# Patient Record
Sex: MALE | Race: OTHER | HISPANIC OR LATINO | URBAN - METROPOLITAN AREA
[De-identification: names, ages, dates, MRNs, and addresses within clinical notes are randomized per-mention and may not be internally consistent; named-entity substitution may affect disease eponyms.]

---

## 2018-03-16 ENCOUNTER — TRANSCRIBE ORDERS (OUTPATIENT)
Dept: ADMINISTRATIVE | Facility: HOSPITAL | Age: 38
End: 2018-03-16

## 2018-03-16 DIAGNOSIS — R94.5 NONSPECIFIC ABNORMAL RESULTS OF LIVER FUNCTION STUDY: Primary | ICD-10-CM

## 2018-03-23 ENCOUNTER — APPOINTMENT (OUTPATIENT)
Dept: SLEEP MEDICINE | Facility: HOSPITAL | Age: 38
End: 2018-03-23
Attending: INTERNAL MEDICINE

## 2018-03-27 ENCOUNTER — HOSPITAL ENCOUNTER (OUTPATIENT)
Dept: ULTRASOUND IMAGING | Facility: HOSPITAL | Age: 38
Discharge: HOME | End: 2018-03-27
Admitting: NURSE PRACTITIONER

## 2018-03-27 DIAGNOSIS — R94.5 NONSPECIFIC ABNORMAL RESULTS OF LIVER FUNCTION STUDY: ICD-10-CM

## 2018-03-27 PROCEDURE — 76705 ECHO EXAM OF ABDOMEN: CPT

## 2018-03-28 ENCOUNTER — OFFICE VISIT (OUTPATIENT)
Dept: SLEEP MEDICINE | Facility: HOSPITAL | Age: 38
End: 2018-03-28
Attending: INTERNAL MEDICINE

## 2018-03-28 VITALS
DIASTOLIC BLOOD PRESSURE: 94 MMHG | HEART RATE: 97 BPM | HEIGHT: 65 IN | BODY MASS INDEX: 29.99 KG/M2 | WEIGHT: 180 LBS | SYSTOLIC BLOOD PRESSURE: 132 MMHG

## 2018-03-28 DIAGNOSIS — G47.10 HYPERSOMNIA: ICD-10-CM

## 2018-03-28 DIAGNOSIS — G47.33 OSA (OBSTRUCTIVE SLEEP APNEA): Primary | ICD-10-CM

## 2018-03-28 DIAGNOSIS — R06.83 SNORING: ICD-10-CM

## 2018-03-28 PROCEDURE — G0463 HOSPITAL OUTPT CLINIC VISIT: HCPCS

## 2018-03-28 NOTE — PROGRESS NOTES
"[facility] SLEEP MEDICINE      Basilio Soriano  37 y.o.  male  1980    PCP:Michela Cano, APRN  [address] [id number]       Type of service: Initial consult.    Chief complaint: Snoring, witnessed apneas and excessive daytime sleepiness      History of present illness;  This is a 37 y.o. male being referred for evaluation of sleep apnea.  The patient reports symptoms of snoring, daytime excessive sleepiness and fatigue.  In addition patient also gives a history of waking up choking and gasping for breath.  Normally goes to bed around 10p.m. and wakes up around 6 a.m, still feels not rested well and tired.       PMH  Hypertension    Medications:  Lisinopril    Social history:  Shift work: no  Tobacco use: no  Alcohol use: yes 5 beers a day  Caffeinated drinks: no  Over-the-counter sleeping aid: no  Narcotic medications: no    Review of systems:  [place] Sleepiness Scale: Total score: 24   Positive for snoring, witnessed apneas, fatigue and daytime excessive sleepiness,   Negative for shortness of breath, cough, wheezing, chest pain, nausea and vomiting, Swelling of feet  Morning symptoms  Dry mouth yes  Moring headaches yes  Nasal Congestion no  Leg movements no  Nocturia (how many times/night) 2-3  Memory Problems yes    Physical exam:  Vitals:    03/28/18 1300   BP: 132/94   Pulse: 97   Weight: 81.6 kg (180 lb)   Height: 165.1 cm (65\")    Body mass index is 29.95 kg/m². Neck Circumference: 18 inches  HEENT: Head is atraumatic, pupils are round equal and reacting to light,  no nasal septal defects or deviation and the nasal passages are clear, tonsils are not enlarged, oral airway Mallampati class IV  NECK: No lymphadenopathy, trachea is in the midline  RESPIRATORY SYSTEM: Breath sounds are equal on both sides, there are no wheezes or crackles  CARDIOVASULAR SYSTEM: Heart sounds are regular and normal, there are no murmurs or thrills  ABDOMEN: Soft, no " hepatosplenomegaly, no evidence of ascites  EXTREMITES: No cyanosis, clubbing or edema   NEUROLOGICAL SYSTEM: Oriented x 3, no gross neurological defects    Assessment and plan:  · Obstructive sleep apnea:  I strongly suspect the patient has sleep apnea as suggested by the symptoms and physical examination.  I have talked to the patient about the signs and symptoms of sleep apnea and consequences of untreated sleep apnea.  I'm going to order a  in lab split night sleep test.  Will have a follow-up after this sleep test is done  · Snoring.  · Hypersomnia most likely secondary to sleep apnea      Enzo Hart MD, [facility]  Pulmonary, Critical Care and sleep Medicine

## 2018-04-11 ENCOUNTER — HOSPITAL ENCOUNTER (OUTPATIENT)
Dept: SLEEP MEDICINE | Facility: HOSPITAL | Age: 38
Discharge: HOME | End: 2018-04-11
Attending: INTERNAL MEDICINE | Admitting: INTERNAL MEDICINE

## 2018-04-11 DIAGNOSIS — R06.83 SNORING: ICD-10-CM

## 2018-04-11 DIAGNOSIS — G47.10 HYPERSOMNIA: ICD-10-CM

## 2018-04-11 DIAGNOSIS — G47.33 OSA (OBSTRUCTIVE SLEEP APNEA): ICD-10-CM

## 2018-04-11 PROCEDURE — 95811 POLYSOM 6/>YRS CPAP 4/> PARM: CPT

## 2018-04-18 ENCOUNTER — TELEPHONE (OUTPATIENT)
Dept: SLEEP MEDICINE | Facility: HOSPITAL | Age: 38
End: 2018-04-18

## 2018-04-18 NOTE — TELEPHONE ENCOUNTER
Spoke with patient to review sleep study results.  Confirmed [facility] as DME. [facility]

## 2018-05-23 ENCOUNTER — APPOINTMENT (OUTPATIENT)
Dept: SLEEP MEDICINE | Facility: HOSPITAL | Age: 38
End: 2018-05-23
Attending: INTERNAL MEDICINE

## 2018-10-10 ENCOUNTER — APPOINTMENT (OUTPATIENT)
Dept: SLEEP MEDICINE | Facility: HOSPITAL | Age: 38
End: 2018-10-10
Attending: INTERNAL MEDICINE

## 2019-01-17 ENCOUNTER — APPOINTMENT (OUTPATIENT)
Dept: GENERAL RADIOLOGY | Facility: HOSPITAL | Age: 39
End: 2019-01-17

## 2019-01-17 ENCOUNTER — HOSPITAL ENCOUNTER (EMERGENCY)
Facility: HOSPITAL | Age: 39
Discharge: HOME | End: 2019-01-17
Attending: EMERGENCY MEDICINE | Admitting: EMERGENCY MEDICINE

## 2019-01-17 VITALS
HEART RATE: 90 BPM | HEIGHT: 65 IN | RESPIRATION RATE: 16 BRPM | SYSTOLIC BLOOD PRESSURE: 141 MMHG | TEMPERATURE: 98.4 F | DIASTOLIC BLOOD PRESSURE: 107 MMHG | WEIGHT: 170 LBS | BODY MASS INDEX: 28.32 KG/M2 | OXYGEN SATURATION: 96 %

## 2019-01-17 DIAGNOSIS — F41.9 ANXIETY: ICD-10-CM

## 2019-01-17 DIAGNOSIS — J18.9 PNEUMONIA OF RIGHT MIDDLE LOBE DUE TO INFECTIOUS ORGANISM: Primary | ICD-10-CM

## 2019-01-17 LAB
FLUAV AG NPH QL: NEGATIVE
FLUBV AG NPH QL IA: NEGATIVE

## 2019-01-17 PROCEDURE — 94640 AIRWAY INHALATION TREATMENT: CPT

## 2019-01-17 PROCEDURE — 87804 INFLUENZA ASSAY W/OPTIC: CPT | Performed by: EMERGENCY MEDICINE

## 2019-01-17 PROCEDURE — 99283 EMERGENCY DEPT VISIT LOW MDM: CPT

## 2019-01-17 PROCEDURE — 71046 X-RAY EXAM CHEST 2 VIEWS: CPT

## 2019-01-17 RX ORDER — HYDROXYZINE PAMOATE 50 MG/1
50 CAPSULE ORAL DAILY
Qty: 10 CAPSULE | Refills: 0 | Status: SHIPPED | OUTPATIENT
Start: 2019-01-17

## 2019-01-17 RX ORDER — IPRATROPIUM BROMIDE AND ALBUTEROL SULFATE 2.5; .5 MG/3ML; MG/3ML
3 SOLUTION RESPIRATORY (INHALATION) ONCE
Status: COMPLETED | OUTPATIENT
Start: 2019-01-17 | End: 2019-01-17

## 2019-01-17 RX ORDER — AZITHROMYCIN 250 MG/1
TABLET, FILM COATED ORAL
Qty: 6 TABLET | Refills: 0 | Status: SHIPPED | OUTPATIENT
Start: 2019-01-17

## 2019-01-17 RX ORDER — PREDNISONE 20 MG/1
20 TABLET ORAL 2 TIMES DAILY
Qty: 10 TABLET | Refills: 0 | Status: SHIPPED | OUTPATIENT
Start: 2019-01-17 | End: 2019-01-22

## 2019-01-17 RX ORDER — ALBUTEROL SULFATE 90 UG/1
2 AEROSOL, METERED RESPIRATORY (INHALATION) EVERY 4 HOURS PRN
Qty: 1 INHALER | Refills: 0 | Status: SHIPPED | OUTPATIENT
Start: 2019-01-17

## 2019-01-17 RX ADMIN — IPRATROPIUM BROMIDE AND ALBUTEROL SULFATE 3 ML: .5; 3 SOLUTION RESPIRATORY (INHALATION) at 13:40

## 2019-01-17 NOTE — DISCHARGE INSTRUCTIONS
Return to the emergency department with worsening symptoms, uncontrolled pain, inability to tolerate oral liquids, fever greater than 101° F not controlled by Tylenol or as needed with emergent concerns.    Call for an appointment with primary care provider to follow-up within the next week if symptoms are not improving.

## 2019-01-17 NOTE — ED PROVIDER NOTES
Subjective   History of Present Illness     History of Present Illness    Chief complaint: Cough    Location: Chest and nasal congestion    Quality/Severity:  Productive with thick, dark green sputum.    Timing/Duration: One month    Modifying Factors: Patient denies taking OTC medications to palliate cough and congestion    Associated Symptoms: Positive for right ear pain, dyspnea on exertion, chills, diarrhea, and vomiting after coughing.  Negative for fever, chest pain, dizziness, syncope.    Narrative:     Patient is a 38-year-old  male presenting to the ED for cough and dyspnea on exertion for one month.  Patient states cough is productive with thick dark green sputum that is difficult to cough up, and he has fullness in his right ear and popping when blowing his nose.  Patient states he has not tried any over-the-counter medication such as expectorants or decongestants.  Patient states he needs to sit down at times when he is up moving around because he becomes short of breath. Patient states he is also having chills and is not sure if he is having a fever at home because he does not own a thermometer.  The patient denies smoking, only past medical history hypertension and ODESSA, but he does not yet have a CPAP.    Patient also complaining of watery diarrhea for the past 2 weeks, patient is taking Pepto-Bismol without significant relief, has not tried Imodium.  Patient denies abdominal pain, blood in stool, dizziness changing position, syncope.      Review of Systems     General: Complaining of chills.  Denies any weakness or fatigue.  Denies any weight loss or weight gain.  SKIN: Denies any rashes lesions or ulcers.  Denies color change.  ENT: Complaining of sore throat, nasal congestion and right ear pain.  EYES: Denies any blurred vision.  Denies any change in vision.  Denies any photophobia.  Denies any vision loss.  LUNGS: Admits to shortness of breath on exertion, wheezing and productive cough with  dark green sputum.  Denies any hemoptysis.  CARDIAC: Denies any chest pain.  Denies palpitations.  Denies syncope.  Denies any edema  ABD: Admits to diarrhea x2 weeks, and vomiting if he coughs too hard. Denies any abdominal pain. Denies any rectal bleeding.  Denies constipation  : Denies any dysuria, urgency, frequency or hematuria.  Denies discharge.  Denies flank pain.  NEURO: Denies any focal weakness.  Denies headache.  Denies seizures.  Denies changes in speech or difficulty walking.  ENDOCRINE: Denies polydipsia and polyuria  M/S: Denies arthralgias, back pain, myalgias or neck pain  HEME/LYMPH: Negative for adenopathy. Does not bruise/bleed easily.   PSYCH: Admits to anxiety during the night. Negative for suicidal ideas. Denies depression  review was performed in addition to those in the above all other reviews are negative.        Past Medical History:   Diagnosis Date   • Hypertension        No Known Allergies    History reviewed. No pertinent surgical history.    History reviewed. No pertinent family history.    Social History     Socioeconomic History   • Marital status: Unknown     Spouse name: Not on file   • Number of children: Not on file   • Years of education: Not on file   • Highest education level: Not on file   Tobacco Use   • Smoking status: Never Smoker   Substance and Sexual Activity   • Alcohol use: No     Frequency: Never   • Drug use: No       No current facility-administered medications for this encounter.   No current outpatient medications on file.      Objective   Physical Exam     Vitals:    01/17/19 1340   BP:    Pulse: 89   Resp: 16   Temp:    SpO2: 97%   /112, temp 98.4    Physical Exam  Constitutional: Pt is oriented to person, place, and time, appears well-developed and well-nourished. No distress.   HENT:   Head: Normocephalic and atraumatic.   Right Ear: External ear normal. TM normal.  Left Ear: External ear normal. TM normal.  Mouth/Throat: Oropharynx is clear and  moist. No oropharyngeal exudate.   Eyes: EOM are normal. Pupils are equal, round, and reactive to light.   Neck: Normal range of motion. Neck supple.   Cardiovascular: Normal rate, regular rhythm and normal heart sounds. Exam reveals no murmur, rubs, or gallops.   Pulmonary/Chest: Respiratory effort normal. Wheezing and rhonchi bilaterally.   Abdominal: Soft, non-distended. Bowel sounds are active. There is no tenderness or guarding.   Neurological: Pt is alert and oriented to person, place, and time. No cranial nerve deficit or sensory deficit. Pt exhibits normal muscle tone and strength in all extremities bilaterally. Coordination normal.   MSK: No gross deformities appreciated.   Skin: Skin is warm and dry.   Psychiatric: Pt has a normal mood and affect. Behavior is normal. Judgment and thought content normal.           Procedures           ED Course  ED Course as of Jan 17 1345   Thu Jan 17, 2019   1338 Influenza A Ag, EIA: Negative [KS]   1338 Influenza B Ag, EIA: Negative [KS]      ED Course User Index  [KS] Mary Nogueira, PAKarlieC        1430 Patient given duo neb, states he was able to cough up more phlegm afterward. Patient reassessed and is still wheezing throughout, rhonchi predominantly heard in right lung.     Xr Chest 2 View    Result Date: 1/17/2019  Narrative: CHEST X-RAY, 1/17/2019     HISTORY: 38-year-old male in the ED complaining of one month history productive cough, chills and body aches.  TECHNIQUE: PA and lateral upright chest x-ray.  FINDINGS: The exam shows mild infiltrate in the right middle lobe as well as bilateral peribronchial cuffing. The lungs are otherwise clear. No pleural effusion. Heart size and pulmonary vascularity are normal.      Impression: 1. Right middle lobe infiltrate. 2. Perihilar bronchial cuffing likely associated with inflammatory airway disease.  This report was finalized on 1/17/2019 2:31 PM by Dr. Layo Ozuna MD.        Reviewed CXR. Independently viewed  by me. Interpreted by radiologist. Discussed with patient.     Patient discharged with albuterol inhaler, Z-francisco, prednisone for his pneumonia, and vistaril for his anxiety. Patient instructed to follow up with PCP next week. Advised patient he could take OTC loperamide, and to follow up with PCP about his diarrhea.    Discussed pertinent imaging findings with the patient/family.  Patient/Family voiced understanding of need to follow-up for recheck, further testing as needed.  Return to the emergency Department warnings were given.                MDM  Number of Diagnoses or Management Options  Anxiety: new and does not require workup  Pneumonia of right middle lobe due to infectious organism (CMS/HCC): new and requires workup     Amount and/or Complexity of Data Reviewed  Clinical lab tests: reviewed and ordered  Tests in the radiology section of CPT®: reviewed and ordered  Tests in the medicine section of CPT®: ordered and reviewed  Independent visualization of images, tracings, or specimens: yes    Patient Progress  Patient progress: improved        Final diagnoses:   Pneumonia of right middle lobe due to infectious organism (CMS/HCC)   Anxiety     Dictated utilizing Dragon dictation         Mary Nogueira PA-C  01/17/19 0973

## 2019-07-19 ENCOUNTER — OFFICE VISIT (OUTPATIENT)
Dept: SLEEP MEDICINE | Facility: HOSPITAL | Age: 39
End: 2019-07-19

## 2019-07-19 VITALS
HEIGHT: 65 IN | WEIGHT: 165 LBS | HEART RATE: 95 BPM | DIASTOLIC BLOOD PRESSURE: 102 MMHG | BODY MASS INDEX: 27.49 KG/M2 | SYSTOLIC BLOOD PRESSURE: 158 MMHG

## 2019-07-19 DIAGNOSIS — R06.83 SNORING: ICD-10-CM

## 2019-07-19 DIAGNOSIS — G47.10 HYPERSOMNIA: ICD-10-CM

## 2019-07-19 DIAGNOSIS — G47.33 OSA (OBSTRUCTIVE SLEEP APNEA): Primary | ICD-10-CM

## 2019-07-19 PROCEDURE — G0463 HOSPITAL OUTPT CLINIC VISIT: HCPCS

## 2019-07-19 PROCEDURE — 99214 OFFICE O/P EST MOD 30 MIN: CPT | Performed by: INTERNAL MEDICINE

## 2019-07-19 NOTE — PROGRESS NOTES
[facility]  [address]  [address]  [place] [address]  Phone   Fax       SLEEP CLINIC FOLLOW UP PROGRESS NOTE.    Basilio Soriano  1980  38 y.o.  male      PCP: Michela Cano APRN      Date of visit: 7/19/2019    Chief Complaint   Patient presents with   • Sleep Apnea       INTERM HISTORY:  This is a 38-year-old male who underwent split-night sleep study last year in April 2018.  He was found to have severe sleep apnea with AHI of 125/h and underwent CPAP titration.  After the study he had a family emergency in [place] and the left to [place] without getting his CPAP.  He tells me that his father had a stroke and had to take care of him.  Now he is back and his symptoms are worse with the daytime excessive sleepiness, snoring and fatigue.  He is accompanied by his mother.  He is in the construction work.       Sleep schedule  Normally goes to bed at 1030 PM  Wakes up at 6 AM  Feels unrefreshed after waking up:       PAST MEDICAL HISTORY:  · Obstructive sleep apnea, severe with AHI of 125/h  · Hypersomnia secondary to sleep apnea  · Snoring  Past Medical History:   Diagnosis Date   • Hypertension        MEDICATIONS: reviewed by me    Current Outpatient Medications:   •  albuterol sulfate  (90 Base) MCG/ACT inhaler, Inhale 2 puffs Every 4 (Four) Hours As Needed for Wheezing., Disp: 1 inhaler, Rfl: 0  •  azithromycin (ZITHROMAX Z-YING) 250 MG tablet, Take 2 tablets the first day, then 1 tablet daily for 4 days., Disp: 6 tablet, Rfl: 0  •  hydrOXYzine (VISTARIL) 50 MG capsule, Take 1 capsule by mouth Daily. Before bedtime., Disp: 10 capsule, Rfl: 0    No Known Allergies reviewed by me    SOCIAL, FAMILY HISTORY: Medical records are reviewed and noted by me.    REVIEW OF SYSTEMS:   [place] Sleepiness Scale :Total score: 24   Snoring: Yes  Morning headache: Yes  Nasal congestion: no  Leg movements: No  Leg swelling no  Irregular heart beat  "no  Heart burn no    PHYSICAL EXAMINATION:  Vitals:    07/19/19 1300   BP: (!) 158/102   Pulse: 95   Weight: 74.8 kg (165 lb)   Height: 165.1 cm (65\")    Body mass index is 27.46 kg/m².    HEENT: pupils are round equal and reacting to light and accommodation, nasal passage is clear, no nasal polyps, no lymphadenopathy, throat is clear, oral airway Mallampati class 4  RESPRATORY SYSTEM: Breath sounds are equal on both sides and are normal, no wheezes or crackles  CARDIOVASULAR SYSTEM: Heart rate is regular without murmur  ABDOMEN: Soft, no ascites, no hepatosplenomegaly.  EXTREMITIES: No cyanosis, clubbing or edema       ASSESSMENT AND PLAN:  · Obstructive sleep apnea, patient has severe sleep apnea.  Unfortunately he did not get the CPAP.  I have talked to the patient and his mother about the severity of sleep apnea.  I am going to arrange auto CPAP between 6 and 20 cm and see him in follow-up.  I am glad that he is back to get his CPAP.  And also I am happy to see that he did not had any complications due to severe sleep apnea.  · Snoring secondary to sleep apnea  · Hypersomnia, patient's [place] Sleepiness Scale is 24.  He has severe sleep apnea and this should improve with treatment of his severe sleep apnea        Enzo Hart MD, [facility]  Sleep Medicine.(Board-certified)  [facility]   7/19/2019               "

## 2019-08-29 ENCOUNTER — TELEPHONE (OUTPATIENT)
Dept: SLEEP MEDICINE | Facility: HOSPITAL | Age: 39
End: 2019-08-29

## 2019-08-29 NOTE — TELEPHONE ENCOUNTER
Patient called today having trouble with pressure too high, printed his download and will put in Dr. Hart's box to look over. Scheduled him first avavilable f/u on 9/27/19, told patient I would call him tomorrow after the doctor looked it over-AK

## 2019-08-30 ENCOUNTER — TELEPHONE (OUTPATIENT)
Dept: SLEEP MEDICINE | Facility: HOSPITAL | Age: 39
End: 2019-08-30

## 2019-08-30 NOTE — TELEPHONE ENCOUNTER
Spoke w/patient and let him know that he has huge leak in mask, patient has spoken w/[facility] and they are supposed to be working on getting him a new mask. Told patient that should help with the pressures being so high once the mask leak is corrected. He has a f/u on 9/27/19-AK

## 2019-09-27 ENCOUNTER — APPOINTMENT (OUTPATIENT)
Dept: SLEEP MEDICINE | Facility: HOSPITAL | Age: 39
End: 2019-09-27

## 2020-02-14 ENCOUNTER — APPOINTMENT (OUTPATIENT)
Dept: SLEEP MEDICINE | Facility: HOSPITAL | Age: 40
End: 2020-02-14

## 2020-03-14 ENCOUNTER — HOSPITAL ENCOUNTER (EMERGENCY)
Facility: HOSPITAL | Age: 40
Discharge: HOME | End: 2020-03-14
Attending: EMERGENCY MEDICINE | Admitting: EMERGENCY MEDICINE

## 2020-03-14 VITALS
BODY MASS INDEX: 26.89 KG/M2 | RESPIRATION RATE: 18 BRPM | DIASTOLIC BLOOD PRESSURE: 81 MMHG | TEMPERATURE: 97.8 F | SYSTOLIC BLOOD PRESSURE: 129 MMHG | HEIGHT: 66 IN | WEIGHT: 167.3 LBS | HEART RATE: 96 BPM | OXYGEN SATURATION: 98 %

## 2020-03-14 DIAGNOSIS — F10.929 ALCOHOLIC INTOXICATION WITH COMPLICATION (HCC): ICD-10-CM

## 2020-03-14 DIAGNOSIS — IMO0002 SELF-INFLICTED INJURY: ICD-10-CM

## 2020-03-14 DIAGNOSIS — T07.XXXA MULTIPLE ABRASIONS: Primary | ICD-10-CM

## 2020-03-14 DIAGNOSIS — I10 ESSENTIAL HYPERTENSION: ICD-10-CM

## 2020-03-14 LAB
ALBUMIN SERPL-MCNC: 3.8 G/DL (ref 3.5–5.2)
ALBUMIN/GLOB SERPL: 0.9 G/DL
ALP SERPL-CCNC: 82 U/L (ref 39–117)
ALT SERPL W P-5'-P-CCNC: 54 U/L (ref 1–41)
AMPHET+METHAMPHET UR QL: NEGATIVE
AMPHETAMINES UR QL: NEGATIVE
ANION GAP SERPL CALCULATED.3IONS-SCNC: 13.4 MMOL/L (ref 5–15)
APAP SERPL-MCNC: <5 MCG/ML (ref 10–30)
AST SERPL-CCNC: 64 U/L (ref 1–40)
BARBITURATES UR QL SCN: NEGATIVE
BASOPHILS # BLD AUTO: 0.06 10*3/MM3 (ref 0–0.2)
BASOPHILS NFR BLD AUTO: 1.4 % (ref 0–1.5)
BENZODIAZ UR QL SCN: NEGATIVE
BILIRUB SERPL-MCNC: 0.4 MG/DL (ref 0.2–1.2)
BUN BLD-MCNC: 4 MG/DL (ref 6–20)
BUN/CREAT SERPL: 7.8 (ref 7–25)
BUPRENORPHINE SERPL-MCNC: NEGATIVE NG/ML
CALCIUM SPEC-SCNC: 8.8 MG/DL (ref 8.6–10.5)
CANNABINOIDS SERPL QL: NEGATIVE
CHLORIDE SERPL-SCNC: 104 MMOL/L (ref 98–107)
CO2 SERPL-SCNC: 23.6 MMOL/L (ref 22–29)
COCAINE UR QL: NEGATIVE
CREAT BLD-MCNC: 0.51 MG/DL (ref 0.76–1.27)
DEPRECATED RDW RBC AUTO: 48 FL (ref 37–54)
EOSINOPHIL # BLD AUTO: 0.08 10*3/MM3 (ref 0–0.4)
EOSINOPHIL NFR BLD AUTO: 1.8 % (ref 0.3–6.2)
ERYTHROCYTE [DISTWIDTH] IN BLOOD BY AUTOMATED COUNT: 14.5 % (ref 12.3–15.4)
ETHANOL BLD-MCNC: 289 MG/DL (ref 0–10)
ETHANOL UR QL: 0.29 %
GFR SERPL CREATININE-BSD FRML MDRD: >150 ML/MIN/1.73
GLOBULIN UR ELPH-MCNC: 4.2 GM/DL
GLUCOSE BLD-MCNC: 140 MG/DL (ref 65–99)
HCT VFR BLD AUTO: 45.3 % (ref 37.5–51)
HGB BLD-MCNC: 15 G/DL (ref 13–17.7)
IMM GRANULOCYTES # BLD AUTO: 0 10*3/MM3 (ref 0–0.05)
IMM GRANULOCYTES NFR BLD AUTO: 0 % (ref 0–0.5)
LYMPHOCYTES # BLD AUTO: 2.25 10*3/MM3 (ref 0.7–3.1)
LYMPHOCYTES NFR BLD AUTO: 51.6 % (ref 19.6–45.3)
MCH RBC QN AUTO: 30.1 PG (ref 26.6–33)
MCHC RBC AUTO-ENTMCNC: 33.1 G/DL (ref 31.5–35.7)
MCV RBC AUTO: 91 FL (ref 79–97)
METHADONE UR QL SCN: NEGATIVE
MONOCYTES # BLD AUTO: 0.56 10*3/MM3 (ref 0.1–0.9)
MONOCYTES NFR BLD AUTO: 12.8 % (ref 5–12)
NEUTROPHILS # BLD AUTO: 1.41 10*3/MM3 (ref 1.7–7)
NEUTROPHILS NFR BLD AUTO: 32.4 % (ref 42.7–76)
NRBC BLD AUTO-RTO: 0 /100 WBC (ref 0–0.2)
OPIATES UR QL: NEGATIVE
OXYCODONE UR QL SCN: NEGATIVE
PCP UR QL SCN: NEGATIVE
PLATELET # BLD AUTO: 219 10*3/MM3 (ref 140–450)
PMV BLD AUTO: 10 FL (ref 6–12)
POTASSIUM BLD-SCNC: 3.9 MMOL/L (ref 3.5–5.2)
PROPOXYPH UR QL: NEGATIVE
PROT SERPL-MCNC: 8 G/DL (ref 6–8.5)
RBC # BLD AUTO: 4.98 10*6/MM3 (ref 4.14–5.8)
SALICYLATES SERPL-MCNC: <3 MG/DL
SODIUM BLD-SCNC: 141 MMOL/L (ref 136–145)
TRICYCLICS UR QL SCN: NEGATIVE
WBC NRBC COR # BLD: 4.36 10*3/MM3 (ref 3.4–10.8)

## 2020-03-14 PROCEDURE — 80053 COMPREHEN METABOLIC PANEL: CPT | Performed by: EMERGENCY MEDICINE

## 2020-03-14 PROCEDURE — 85025 COMPLETE CBC W/AUTO DIFF WBC: CPT | Performed by: EMERGENCY MEDICINE

## 2020-03-14 PROCEDURE — 80307 DRUG TEST PRSMV CHEM ANLYZR: CPT | Performed by: EMERGENCY MEDICINE

## 2020-03-14 PROCEDURE — 99283 EMERGENCY DEPT VISIT LOW MDM: CPT

## 2020-03-14 PROCEDURE — 99284 EMERGENCY DEPT VISIT MOD MDM: CPT | Performed by: EMERGENCY MEDICINE

## 2020-03-14 NOTE — ED NOTES
Time team evaluating patient per Zoom (IPAD) meeting id # [id number]     Summer Mathew RN  03/14/20 0952

## 2020-03-14 NOTE — ED NOTES
Patients mother, Lisa Brown, arrived and is at patients bedside.     Lynn Rodrigues, RN  03/14/20 0347

## 2020-03-14 NOTE — ED NOTES
Patient asleep and arouses easily  Breakfast tray given to patient.  Explained to patient that Augusto Ledbetter will evaluate him shortly.  Patient requesting medication for anxiety.  Explained to him that MD would not medicate him at this time     Summer Mathew RN  03/14/20 0945
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
## 2020-03-14 NOTE — ED NOTES
Spoke with Jessie from Time team. She states that the evaluators will no longer be coming out to the facilities, but can evaluate patients by using tele communication.  Lynn Rodrigues RN  03/14/20 0334       Lynn Rodrigues RN  03/14/20 0337

## 2020-03-14 NOTE — ED NOTES
Jerzy Chong at the [facility].  Patient agrees with outpatient therapy.  Awaiting referral forms to be faxed from the [facility]     Summer Mathew RN  03/14/20 8442

## 2020-03-14 NOTE — ED NOTES
Spoke with patient and mom, [facility] phone number given to mom and patient.  Patient to arrive at the [facility] at 0800 on Monday for dual outpatient treatment     Summer Mathew RN  03/14/20 8731
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
## 2020-03-14 NOTE — ED PROVIDER NOTES
Subjective   History of Present Illness    Review of Systems    Past Medical History:   Diagnosis Date   • Anxiety    • Depression    • Hypertension        No Known Allergies    History reviewed. No pertinent surgical history.    History reviewed. No pertinent family history.    Social History     Socioeconomic History   • Marital status: Unknown     Spouse name: Not on file   • Number of children: Not on file   • Years of education: Not on file   • Highest education level: Not on file   Tobacco Use   • Smoking status: Never Smoker   Substance and Sexual Activity   • Alcohol use: Yes     Frequency: Never     Comment: Drinks daily   • Drug use: No           Objective   Physical Exam    Procedures           ED Course  ED Course as of Mar 14 1118   Sat Mar 14, 2020   0226 The laboratory values were reviewed by me.  The acetaminophen level is normal.  The serum glucose is 140.  The ALT is mildly elevated at 54 and the AST is mildly elevated at 64.  The ethanol level is 289.  The urine tox screen is pending.  The laboratory values are otherwise unremarkable.    [RC]   0302 The urine drug screen is negative.    [RC]      ED Course User Index  [RC] Salomón Gay MD            reeval, family at bedside and pt agreed to plan for outpt treatment by [facility]  Otherwise appears well w/o compliaint                                MDM  Number of Diagnoses or Management Options  Alcoholic intoxication with complication (CMS/HCC):   Essential hypertension:   Multiple abrasions:   Self-inflicted injury:       Final diagnoses:   Multiple abrasions   Self-inflicted injury   Alcoholic intoxication with complication (CMS/HCC)   Essential hypertension            Bassem Elise MD  03/14/20 1118

## 2020-04-10 ENCOUNTER — APPOINTMENT (OUTPATIENT)
Dept: SLEEP MEDICINE | Facility: HOSPITAL | Age: 40
End: 2020-04-10

## 2023-09-27 ENCOUNTER — HOSPITAL ENCOUNTER (EMERGENCY)
Facility: HOSPITAL | Age: 43
Discharge: HOME | End: 2023-09-27
Attending: EMERGENCY MEDICINE | Admitting: EMERGENCY MEDICINE
Payer: MEDICAID

## 2023-09-27 ENCOUNTER — APPOINTMENT (OUTPATIENT)
Dept: CT IMAGING | Facility: HOSPITAL | Age: 43
End: 2023-09-27
Payer: MEDICAID

## 2023-09-27 VITALS
HEIGHT: 66 IN | HEART RATE: 92 BPM | OXYGEN SATURATION: 85 % | SYSTOLIC BLOOD PRESSURE: 143 MMHG | DIASTOLIC BLOOD PRESSURE: 98 MMHG | BODY MASS INDEX: 31.64 KG/M2 | WEIGHT: 196.9 LBS | RESPIRATION RATE: 15 BRPM | TEMPERATURE: 98.6 F

## 2023-09-27 DIAGNOSIS — F10.129 ALCOHOL ABUSE WITH INTOXICATION: Primary | ICD-10-CM

## 2023-09-27 LAB
ALBUMIN SERPL-MCNC: 4.4 G/DL (ref 3.5–5.2)
ALBUMIN/GLOB SERPL: 1.3 G/DL
ALP SERPL-CCNC: 122 U/L (ref 39–117)
ALT SERPL W P-5'-P-CCNC: 26 U/L (ref 1–41)
AMPHET+METHAMPHET UR QL: NEGATIVE
AMPHETAMINES UR QL: NEGATIVE
ANION GAP SERPL CALCULATED.3IONS-SCNC: 16.7 MMOL/L (ref 5–15)
AST SERPL-CCNC: 28 U/L (ref 1–40)
BARBITURATES UR QL SCN: NEGATIVE
BASOPHILS # BLD AUTO: 0.04 10*3/MM3 (ref 0–0.2)
BASOPHILS NFR BLD AUTO: 0.6 % (ref 0–1.5)
BENZODIAZ UR QL SCN: NEGATIVE
BILIRUB SERPL-MCNC: 0.2 MG/DL (ref 0–1.2)
BILIRUB UR QL STRIP: NEGATIVE
BUN SERPL-MCNC: 7 MG/DL (ref 6–20)
BUN/CREAT SERPL: 13.5 (ref 7–25)
BUPRENORPHINE SERPL-MCNC: NEGATIVE NG/ML
CALCIUM SPEC-SCNC: 9 MG/DL (ref 8.6–10.5)
CANNABINOIDS SERPL QL: NEGATIVE
CHLORIDE SERPL-SCNC: 100 MMOL/L (ref 98–107)
CLARITY UR: CLEAR
CO2 SERPL-SCNC: 21.3 MMOL/L (ref 22–29)
COCAINE UR QL: NEGATIVE
COLOR UR: YELLOW
CREAT SERPL-MCNC: 0.52 MG/DL (ref 0.76–1.27)
DEPRECATED RDW RBC AUTO: 40.8 FL (ref 37–54)
EGFRCR SERPLBLD CKD-EPI 2021: 129.1 ML/MIN/1.73
EOSINOPHIL # BLD AUTO: 0.06 10*3/MM3 (ref 0–0.4)
EOSINOPHIL NFR BLD AUTO: 1 % (ref 0.3–6.2)
ERYTHROCYTE [DISTWIDTH] IN BLOOD BY AUTOMATED COUNT: 13.3 % (ref 12.3–15.4)
ETHANOL BLD-MCNC: 297 MG/DL (ref 0–10)
ETHANOL UR QL: 0.3 %
GLOBULIN UR ELPH-MCNC: 3.4 GM/DL
GLUCOSE SERPL-MCNC: 105 MG/DL (ref 65–99)
GLUCOSE UR STRIP-MCNC: NEGATIVE MG/DL
HCT VFR BLD AUTO: 44.6 % (ref 37.5–51)
HGB BLD-MCNC: 15.8 G/DL (ref 13–17.7)
HGB UR QL STRIP.AUTO: NEGATIVE
HOLD SPECIMEN: NORMAL
HOLD SPECIMEN: NORMAL
HYPOCHROMIA BLD QL: NORMAL
IMM GRANULOCYTES # BLD AUTO: 0.01 10*3/MM3 (ref 0–0.05)
IMM GRANULOCYTES NFR BLD AUTO: 0.2 % (ref 0–0.5)
KETONES UR QL STRIP: NEGATIVE
LEUKOCYTE ESTERASE UR QL STRIP.AUTO: NEGATIVE
LIPASE SERPL-CCNC: 29 U/L (ref 13–60)
LYMPHOCYTES # BLD AUTO: 3.44 10*3/MM3 (ref 0.7–3.1)
LYMPHOCYTES NFR BLD AUTO: 55.5 % (ref 19.6–45.3)
MAGNESIUM SERPL-MCNC: 1.8 MG/DL (ref 1.6–2.6)
MCH RBC QN AUTO: 29.8 PG (ref 26.6–33)
MCHC RBC AUTO-ENTMCNC: 35.4 G/DL (ref 31.5–35.7)
MCV RBC AUTO: 84.2 FL (ref 79–97)
METHADONE UR QL SCN: NEGATIVE
MONOCYTES # BLD AUTO: 0.6 10*3/MM3 (ref 0.1–0.9)
MONOCYTES NFR BLD AUTO: 9.7 % (ref 5–12)
NEUTROPHILS NFR BLD AUTO: 2.05 10*3/MM3 (ref 1.7–7)
NEUTROPHILS NFR BLD AUTO: 33 % (ref 42.7–76)
NITRITE UR QL STRIP: NEGATIVE
NRBC BLD AUTO-RTO: 0 /100 WBC (ref 0–0.2)
OPIATES UR QL: NEGATIVE
OXYCODONE UR QL SCN: NEGATIVE
PCP UR QL SCN: NEGATIVE
PH UR STRIP.AUTO: 6 [PH] (ref 4.5–8)
PLAT MORPH BLD: NORMAL
PLATELET # BLD AUTO: 245 10*3/MM3 (ref 140–450)
PMV BLD AUTO: 9.9 FL (ref 6–12)
POTASSIUM SERPL-SCNC: 3.2 MMOL/L (ref 3.5–5.2)
PROPOXYPH UR QL: NEGATIVE
PROT SERPL-MCNC: 7.8 G/DL (ref 6–8.5)
PROT UR QL STRIP: NEGATIVE
QT INTERVAL: 375 MS
QTC INTERVAL: 447 MS
RBC # BLD AUTO: 5.3 10*6/MM3 (ref 4.14–5.8)
SODIUM SERPL-SCNC: 138 MMOL/L (ref 136–145)
SP GR UR STRIP: <=1.005 (ref 1–1.03)
TRICYCLICS UR QL SCN: NEGATIVE
TROPONIN T SERPL HS-MCNC: <6 NG/L
UROBILINOGEN UR QL STRIP: NORMAL
WBC MORPH BLD: NORMAL
WBC NRBC COR # BLD: 6.2 10*3/MM3 (ref 3.4–10.8)
WHOLE BLOOD HOLD COAG: NORMAL
WHOLE BLOOD HOLD SPECIMEN: NORMAL

## 2023-09-27 PROCEDURE — 85025 COMPLETE CBC W/AUTO DIFF WBC: CPT | Performed by: EMERGENCY MEDICINE

## 2023-09-27 PROCEDURE — 85007 BL SMEAR W/DIFF WBC COUNT: CPT | Performed by: EMERGENCY MEDICINE

## 2023-09-27 PROCEDURE — 80053 COMPREHEN METABOLIC PANEL: CPT | Performed by: EMERGENCY MEDICINE

## 2023-09-27 PROCEDURE — 82077 ASSAY SPEC XCP UR&BREATH IA: CPT | Performed by: EMERGENCY MEDICINE

## 2023-09-27 PROCEDURE — 84484 ASSAY OF TROPONIN QUANT: CPT

## 2023-09-27 PROCEDURE — 83735 ASSAY OF MAGNESIUM: CPT

## 2023-09-27 PROCEDURE — 83690 ASSAY OF LIPASE: CPT | Performed by: EMERGENCY MEDICINE

## 2023-09-27 PROCEDURE — 96374 THER/PROPH/DIAG INJ IV PUSH: CPT

## 2023-09-27 PROCEDURE — 25010000002 ONDANSETRON PER 1 MG: Performed by: EMERGENCY MEDICINE

## 2023-09-27 PROCEDURE — 25510000001 IOPAMIDOL PER 1 ML: Performed by: EMERGENCY MEDICINE

## 2023-09-27 PROCEDURE — 80306 DRUG TEST PRSMV INSTRMNT: CPT

## 2023-09-27 PROCEDURE — 74177 CT ABD & PELVIS W/CONTRAST: CPT

## 2023-09-27 PROCEDURE — 99285 EMERGENCY DEPT VISIT HI MDM: CPT

## 2023-09-27 PROCEDURE — 81003 URINALYSIS AUTO W/O SCOPE: CPT | Performed by: EMERGENCY MEDICINE

## 2023-09-27 PROCEDURE — 25010000002 DIPHENHYDRAMINE PER 50 MG: Performed by: EMERGENCY MEDICINE

## 2023-09-27 PROCEDURE — 93005 ELECTROCARDIOGRAM TRACING: CPT

## 2023-09-27 PROCEDURE — 25010000002 PROCHLORPERAZINE 10 MG/2ML SOLUTION: Performed by: EMERGENCY MEDICINE

## 2023-09-27 PROCEDURE — 96375 TX/PRO/DX INJ NEW DRUG ADDON: CPT

## 2023-09-27 RX ORDER — HYDROXYZINE HYDROCHLORIDE 25 MG/1
25 TABLET, FILM COATED ORAL ONCE
Status: COMPLETED | OUTPATIENT
Start: 2023-09-27 | End: 2023-09-27

## 2023-09-27 RX ORDER — PROCHLORPERAZINE EDISYLATE 5 MG/ML
10 INJECTION INTRAMUSCULAR; INTRAVENOUS ONCE
Status: COMPLETED | OUTPATIENT
Start: 2023-09-27 | End: 2023-09-27

## 2023-09-27 RX ORDER — ONDANSETRON 2 MG/ML
8 INJECTION INTRAMUSCULAR; INTRAVENOUS ONCE
Status: COMPLETED | OUTPATIENT
Start: 2023-09-27 | End: 2023-09-27

## 2023-09-27 RX ORDER — SODIUM CHLORIDE 0.9 % (FLUSH) 0.9 %
10 SYRINGE (ML) INJECTION AS NEEDED
Status: DISCONTINUED | OUTPATIENT
Start: 2023-09-27 | End: 2023-09-27 | Stop reason: HOSPADM

## 2023-09-27 RX ORDER — DIPHENHYDRAMINE HYDROCHLORIDE 50 MG/ML
25 INJECTION INTRAMUSCULAR; INTRAVENOUS ONCE
Status: COMPLETED | OUTPATIENT
Start: 2023-09-27 | End: 2023-09-27

## 2023-09-27 RX ADMIN — ONDANSETRON 8 MG: 2 INJECTION INTRAMUSCULAR; INTRAVENOUS at 14:20

## 2023-09-27 RX ADMIN — PROCHLORPERAZINE EDISYLATE 10 MG: 5 INJECTION INTRAMUSCULAR; INTRAVENOUS at 16:48

## 2023-09-27 RX ADMIN — SODIUM CHLORIDE 1000 ML: 9 INJECTION, SOLUTION INTRAVENOUS at 14:54

## 2023-09-27 RX ADMIN — IOPAMIDOL 100 ML: 755 INJECTION, SOLUTION INTRAVENOUS at 15:07

## 2023-09-27 RX ADMIN — DIPHENHYDRAMINE HYDROCHLORIDE 25 MG: 50 INJECTION, SOLUTION INTRAMUSCULAR; INTRAVENOUS at 16:48

## 2023-09-27 RX ADMIN — HYDROXYZINE HYDROCHLORIDE 25 MG: 25 TABLET ORAL at 15:23

## 2023-09-27 NOTE — DISCHARGE INSTRUCTIONS
Continue medications as directed.  Follow-up with the [place] outpatient.  Return to the ED for worsening symptoms or medical emergencies.

## 2023-09-27 NOTE — ED NOTES
Time Team called for assessment - [facility] reports one assessment prior to this patient's and then patient will be assessed.

## 2023-09-27 NOTE — ED PROVIDER NOTES
EMERGENCY DEPARTMENT ENCOUNTER      Room Number: 05/05    History is provided by the patient, no translation services needed    HPI:    Chief complaint: Abdominal pain    Location: Generalized abdomen    Quality/Severity: Patient describes the pain as a moderate cramping pain.    Timing/Duration: 4 days    Modifying Factors: None    Associated Symptoms: Nausea, vomiting, diarrhea, chest tightness    Narrative: Pt is a 43 y.o. male who presents complaining of generalized abdominal pain x4 days.  He advises that his ex-wife recently passed away and he has been grieving her death.  He states that he has been drinking approximately 16 alcoholic beverages per day in an attempt to help him cope with the loss of her.  Since he has been heavily drinking he has begun to experience a moderate cramping pain in his generalized abdomen.  He is also experiencing nausea, vomiting, and diarrhea.  He states that he has chest tightness as well but he believes that may be secondary to anxiety.  He denies any cough or shortness of breath.  He denies any fevers or chills.  He denies any known sick person contacts.  Patient denies any dysuria or hematuria.  He states that he did notice bright red blood per his rectum after a bowel movement.  He states that he has had approximately 12 alcoholic beverages today.      PMD: Parker Suarez MD    REVIEW OF SYSTEMS  Review of Systems   Constitutional:  Negative for chills and fever.   Eyes:  Negative for photophobia and visual disturbance.   Respiratory:  Positive for chest tightness. Negative for cough and shortness of breath.    Cardiovascular:  Negative for palpitations and leg swelling.   Gastrointestinal:  Positive for abdominal pain, blood in stool, diarrhea, nausea and vomiting. Negative for constipation.   Genitourinary:  Negative for decreased urine volume, difficulty urinating, dysuria, flank pain and hematuria.   Musculoskeletal:  Negative for back pain, gait problem and neck  pain.   Skin:  Negative for color change, pallor, rash and wound.   Neurological:  Negative for dizziness, syncope, weakness, numbness and headaches.   Psychiatric/Behavioral:  Negative for confusion and suicidal ideas. The patient is nervous/anxious.        PAST MEDICAL HISTORY  Active Ambulatory Problems     Diagnosis Date Noted    ODESSA (obstructive sleep apnea) 03/28/2018    Snoring 03/28/2018    Hypersomnia 03/28/2018     Resolved Ambulatory Problems     Diagnosis Date Noted    No Resolved Ambulatory Problems     Past Medical History:   Diagnosis Date    Anxiety     Depression     Hypertension        PAST SURGICAL HISTORY  No past surgical history on file.    FAMILY HISTORY  No family history on file.    SOCIAL HISTORY  Social History     Socioeconomic History    Marital status: Unknown   Tobacco Use    Smoking status: Never   Substance and Sexual Activity    Alcohol use: Yes     Comment: Drinks daily    Drug use: No       ALLERGIES  Patient has no known allergies.      Current Facility-Administered Medications:     sodium chloride 0.9 % flush 10 mL, 10 mL, Intravenous, PRN, Luis Fernando Odom MD    Current Outpatient Medications:     albuterol sulfate  (90 Base) MCG/ACT inhaler, Inhale 2 puffs Every 4 (Four) Hours As Needed for Wheezing., Disp: 1 inhaler, Rfl: 0    azithromycin (ZITHROMAX Z-YING) 250 MG tablet, Take 2 tablets the first day, then 1 tablet daily for 4 days., Disp: 6 tablet, Rfl: 0    clonazePAM (KlonoPIN) 1 MG tablet, Take 1 tablet by mouth 2 (Two) Times a Day As Needed for Anxiety., Disp: 10 tablet, Rfl: 0    hydrOXYzine (VISTARIL) 50 MG capsule, Take 1 capsule by mouth Daily. Before bedtime., Disp: 10 capsule, Rfl: 0    ondansetron ODT (ZOFRAN-ODT) 4 MG disintegrating tablet, Place 1 tablet on the tongue Every 6 (Six) Hours As Needed for Nausea or Vomiting., Disp: 15 tablet, Rfl: 0    PHYSICAL EXAM  ED Triage Vitals   Temp Heart Rate Resp BP SpO2   09/27/23 1412 09/27/23 1411 09/27/23 1411  09/27/23 1411 09/27/23 1414   98.6 °F (37 °C) 102 18 (!) 154/115 99 %      Temp src Heart Rate Source Patient Position BP Location FiO2 (%)   09/27/23 1411 09/27/23 1411 -- -- --   Oral Monitor          Physical Exam  Vitals and nursing note reviewed.   Constitutional:       General: He is not in acute distress.     Appearance: Normal appearance. He is not ill-appearing, toxic-appearing or diaphoretic.   HENT:      Head: Normocephalic and atraumatic.      Nose: Nose normal. No congestion or rhinorrhea.      Mouth/Throat:      Mouth: Mucous membranes are moist.      Pharynx: Oropharynx is clear.   Eyes:      General: No scleral icterus.        Right eye: No discharge.         Left eye: No discharge.      Extraocular Movements: Extraocular movements intact.      Conjunctiva/sclera: Conjunctivae normal.      Pupils: Pupils are equal, round, and reactive to light.   Cardiovascular:      Rate and Rhythm: Normal rate and regular rhythm.      Heart sounds: Normal heart sounds.     No friction rub.   Pulmonary:      Effort: Pulmonary effort is normal. No respiratory distress.      Breath sounds: Normal breath sounds. No stridor. No wheezing, rhonchi or rales.   Chest:      Chest wall: No tenderness.   Abdominal:      General: Bowel sounds are normal. There is no distension.      Palpations: Abdomen is soft. There is no mass.      Tenderness: There is abdominal tenderness (Generalized). There is no guarding or rebound.   Musculoskeletal:         General: No swelling, tenderness, deformity or signs of injury. Normal range of motion.      Cervical back: Normal range of motion and neck supple. No rigidity.      Right lower leg: No edema.      Left lower leg: No edema.   Skin:     General: Skin is warm and dry.      Coloration: Skin is not jaundiced or pale.      Findings: No bruising, erythema, lesion or rash.   Neurological:      Mental Status: He is alert and oriented to person, place, and time.      Motor: No weakness.       Coordination: Coordination normal.   Psychiatric:         Mood and Affect: Mood and affect normal.         LAB RESULTS  Lab Results (last 24 hours)       Procedure Component Value Units Date/Time    CBC & Differential [[id number]]  (Abnormal) Collected: 09/27/23 1419    Specimen: Blood Updated: 09/27/23 1444    Narrative:      The following orders were created for panel order CBC & Differential.  Procedure                               Abnormality         Status                     ---------                               -----------         ------                     CBC Auto Differential[[id number]]        Abnormal            Final result               Scan Slide[[id number]]                                       Final result                 Please view results for these tests on the individual orders.    Comprehensive Metabolic Panel [[id number]]  (Abnormal) Collected: 09/27/23 1419    Specimen: Blood Updated: 09/27/23 1442     Glucose 105 mg/dL      BUN 7 mg/dL      Creatinine 0.52 mg/dL      Sodium 138 mmol/L      Potassium 3.2 mmol/L      Chloride 100 mmol/L      CO2 21.3 mmol/L      Calcium 9.0 mg/dL      Total Protein 7.8 g/dL      Albumin 4.4 g/dL      ALT (SGPT) 26 U/L      AST (SGOT) 28 U/L      Alkaline Phosphatase 122 U/L      Total Bilirubin 0.2 mg/dL      Globulin 3.4 gm/dL      A/G Ratio 1.3 g/dL      BUN/Creatinine Ratio 13.5     Anion Gap 16.7 mmol/L      eGFR 129.1 mL/min/1.73     Narrative:      GFR Normal >60  Chronic Kidney Disease <60  Kidney Failure <15      Lipase [[id number]]  (Normal) Collected: 09/27/23 1419    Specimen: Blood Updated: 09/27/23 1442     Lipase 29 U/L     Ethanol [[id number]]  (Abnormal) Collected: 09/27/23 1419    Specimen: Blood Updated: 09/27/23 1442     Ethanol 297 mg/dL      Ethanol % 0.297 %     CBC Auto Differential [[id number]]  (Abnormal) Collected: 09/27/23 1419    Specimen: Blood Updated: 09/27/23 1442     WBC 6.20 10*3/mm3      RBC 5.30 10*6/mm3      Hemoglobin 15.8  g/dL      Hematocrit 44.6 %      MCV 84.2 fL      MCH 29.8 pg      MCHC 35.4 g/dL      RDW 13.3 %      RDW-SD 40.8 fl      MPV 9.9 fL      Platelets 245 10*3/mm3      Neutrophil % 33.0 %      Lymphocyte % 55.5 %      Monocyte % 9.7 %      Eosinophil % 1.0 %      Basophil % 0.6 %      Immature Grans % 0.2 %      Neutrophils, Absolute 2.05 10*3/mm3      Lymphocytes, Absolute 3.44 10*3/mm3      Monocytes, Absolute 0.60 10*3/mm3      Eosinophils, Absolute 0.06 10*3/mm3      Basophils, Absolute 0.04 10*3/mm3      Immature Grans, Absolute 0.01 10*3/mm3      nRBC 0.0 /100 WBC     Scan Slide [[id number]] Collected: 09/27/23 1419    Specimen: Blood Updated: 09/27/23 1444     Hypochromia Slight/1+     WBC Morphology Normal     Platelet Morphology Normal    Single High Sensitivity Troponin T [[id number]]  (Normal) Collected: 09/27/23 1419    Specimen: Blood Updated: 09/27/23 1456     HS Troponin T <6 ng/L     Narrative:      High Sensitive Troponin T Reference Range:  <10.0 ng/L- Negative Female for AMI  <15.0 ng/L- Negative Male for AMI  >=10 - Abnormal Female indicating possible myocardial injury.  >=15 - Abnormal Male indicating possible myocardial injury.   Clinicians would have to utilize clinical acumen, EKG, Troponin, and serial changes to determine if it is an Acute Myocardial Infarction or myocardial injury due to an underlying chronic condition.         Magnesium [[id number]]  (Normal) Collected: 09/27/23 1419    Specimen: Blood Updated: 09/27/23 1505     Magnesium 1.8 mg/dL     Urinalysis With Microscopic If Indicated (No Culture) - Urine, Clean Catch [[id number]]  (Normal) Collected: 09/27/23 1514    Specimen: Urine, Clean Catch Updated: 09/27/23 1527     Color, UA Yellow     Appearance, UA Clear     pH, UA 6.0     Specific Gravity, UA <=1.005     Glucose, UA Negative     Ketones, UA Negative     Bilirubin, UA Negative     Blood, UA Negative     Protein, UA Negative     Leuk Esterase, UA Negative     Nitrite, UA  Negative     Urobilinogen, UA 0.2 E.U./dL    Narrative:      Urine microscopic not indicated.    Urine Drug Screen - Urine, Clean Catch [[id number]]  (Normal) Collected: 09/27/23 1514    Specimen: Urine, Clean Catch Updated: 09/27/23 1539     THC, Screen, Urine Negative     Phencyclidine (PCP), Urine Negative     Cocaine Screen, Urine Negative     Methamphetamine, Ur Negative     Opiate Screen Negative     Amphetamine Screen, Urine Negative     Benzodiazepine Screen, Urine Negative     Tricyclic Antidepressants Screen Negative     Methadone Screen, Urine Negative     Barbiturates Screen, Urine Negative     Oxycodone Screen, Urine Negative     Propoxyphene Screen Negative     Buprenorphine, Screen, Urine Negative    Narrative:      Urine drug screen results are to be used for medical purposes only.  They are not to be used for legal purposes such as employment testing.  Negative results do not necessarily mean the complete absence of a subtance, but rather that the result is less than the cutoff for that substance.  Positive results are unconfirmed and considered Preliminary Positive.  [facility] does not automatically confirm Postitive Unconfirmed results.  The physician may request (order) an Unconfirmed Positive result to be sent out for confirmation.      Negative Thresholds for Drugs Screened:    THC screen, urine                          50 ng/ml  Phenycyclidine (PCP), urine                25 ng/ml  Cocaine screen, urine                     150 ng/ml  Methamphetamine, urine                    500 ng/ml  Opiate screen, urine                      100 ng/ml  Amphetamine screen, urine                 500 ng/ml  Benzodiazepine screen, urine              150 ng/ml  Tricyclic Antidepressants screen, urine   300 ng/ml  Methadone screen, urine                   200 ng/ml  Barbiturates screen, urine                200 ng/ml  Oxycodone screen, urine                   100 ng/ml  Propoxyphene screen, urine                 300 ng/ml  Buprenorphine screen, urine                10 ng/ml              I ordered the above labs and reviewed the results    RADIOLOGY  CT Abdomen Pelvis With Contrast    Result Date: 9/27/2023  CT abdomen and pelvis with contrast   9/27/2023  HISTORY: Abdominal pain and nausea for 2 weeks  COMPARISON: 7/11/2023  TECHNIQUE:  CT of Abdomen and Pelvis with contrast performed.  Sagittal and Coronal reconstructions performed. Radiation dose reduction techniques included automated exposure control or exposure modulation based on body size. Radiation audit for CT and nuclear cardiology exams in the last 12 months: 1.  FINDINGS:  Abdomen: Lung bases are clear. Liver, gallbladder, spleen, pancreas, adrenal glands and kidneys are normal in appearance. Aorta is normal in size. There is no adenopathy. Appendix is normal. Bowel is normal.  Pelvis: Bladder and prostate gland are normal. There is no hernia. Bones are unremarkable.      Normal CT abdomen pelvis with contrast   This report was finalized on 9/27/2023 3:21 PM by Dr. Brian Shen MD.       I ordered the above radiologic testing and reviewed the results    PROCEDURES  Procedures      PROGRESS AND CONSULTS  ED Course as of 09/27/23 1940   Wed Sep 27, 2023   1433 The patient appears intoxicated.  His blood pressure is elevated.  All other vital signs are stable and within normal limits.  Labs and imaging ordered to evaluate further. [AH]   1437 Hemoccult is negative. [AH]   1453 EKG         EKG time / Interpretation time: 1448 / 1450  Rhythm/Rate: Sinus, 85   NM: 176  QRS, axis: 266  QTc 447  ST and T waves: No acute ST segment changes or T wave abnormalities.  EKG Tracing Interpreted Contemporaneously by me, independently viewed by me and MD.   [AH]   1936 KHARI Avery, evaluated the patient with the [facility]. An outpatient dual PHP program was recommended but not required for the patient. RN will give the patient all info needed for his follow up.  [AH]    1936 Results discussed in depth with the patient.  He expressed understanding.  Follow-up instructions given.  Return to the ED instructions given. [AH]      ED Course User Index  [AH] Arlette Piper PA-C           MEDICAL DECISION MAKING    MDM       My differential diagnosis for abdominal pain includes but is not limited to:  Gastritis, gastroenteritis, peptic ulcer disease, GERD, esophageal perforation, acute appendicitis, mesenteric adenitis, Meckel’s diverticulum, epiploic appendagitis, diverticulitis, colon cancer, ulcerative colitis, Crohn’s disease, intussusception, small bowel obstruction, adhesions, ischemic bowel, perforated viscus, ileus, obstipation, biliary colic, cholecystitis, cholelithiasis, Serafin-Wilmer Homero, hepatitis, pancreatitis, common bile duct obstruction, cholangitis, bile leak, splenic trauma, splenic rupture, splenic infarction, splenic abscess, abdominal abscess, ascites, spontaneous bacterial peritonitis, hernia, UTI, cystitis, prostatitis, ureterolithiasis, urinary obstruction, AAA, myocardial infarction, pneumonia, cancer, porphyria, DKA, medications, sickle cell, viral syndrome, zoster   DIAGNOSIS  Final diagnoses:   Alcohol abuse with intoxication       Latest Documented Vital Signs:  As of 19:40 EDT  BP- 143/98 HR- 92 Temp- 98.6 °F (37 °C) O2 sat- (!) 85%    DISPOSITION  Pt discharged    Discussed pertinent findings with the patient/family.  Patient/Family voiced understanding of need to follow-up for recheck and further testing as needed.  Return to the Emergency Department warnings were given.         Medication List      No changes were made to your prescriptions during this visit.              Follow-up Information       Parker Suarez MD. Call today.    Specialty: Family Medicine  Why: to schedule follow up  Contact information:  [address]  [facility] [address]  [phone]               [facility]. Call today.    Specialties: Acute [facility], Psychiatry  Why: to  schedule follow up  Contact information:  [address]  [facility] [address] [phone]             Go to  [facility] EMERGENCY DEPARTMENT.    Specialty: Emergency Medicine  Why: If symptoms worsen  Contact information:  [address] [facility] [address] [phone]                             Dictated utilizing [place] dictation     Arlette Piper PA-C  09/27/23 1940

## 2023-12-05 ENCOUNTER — HOSPITAL ENCOUNTER (EMERGENCY)
Facility: HOSPITAL | Age: 43
Discharge: HOME | End: 2023-12-05
Attending: EMERGENCY MEDICINE
Payer: MEDICAID

## 2023-12-05 VITALS
TEMPERATURE: 97.9 F | WEIGHT: 175.71 LBS | RESPIRATION RATE: 14 BRPM | HEIGHT: 65 IN | HEART RATE: 94 BPM | BODY MASS INDEX: 29.27 KG/M2 | DIASTOLIC BLOOD PRESSURE: 85 MMHG | OXYGEN SATURATION: 93 % | SYSTOLIC BLOOD PRESSURE: 114 MMHG

## 2023-12-05 DIAGNOSIS — R11.2 NAUSEA AND VOMITING, UNSPECIFIED VOMITING TYPE: Primary | ICD-10-CM

## 2023-12-05 LAB
ALBUMIN SERPL-MCNC: 4.3 G/DL (ref 3.5–5.2)
ALBUMIN/GLOB SERPL: 1.4 G/DL
ALP SERPL-CCNC: 98 U/L (ref 39–117)
ALT SERPL W P-5'-P-CCNC: 361 U/L (ref 1–41)
ANION GAP SERPL CALCULATED.3IONS-SCNC: 8.6 MMOL/L (ref 5–15)
AST SERPL-CCNC: 264 U/L (ref 1–40)
BASOPHILS # BLD AUTO: 0.03 10*3/MM3 (ref 0–0.2)
BASOPHILS NFR BLD AUTO: 0.5 % (ref 0–1.5)
BILIRUB SERPL-MCNC: 0.5 MG/DL (ref 0–1.2)
BUN SERPL-MCNC: 14 MG/DL (ref 6–20)
BUN/CREAT SERPL: 16.9 (ref 7–25)
CALCIUM SPEC-SCNC: 9.2 MG/DL (ref 8.6–10.5)
CHLORIDE SERPL-SCNC: 103 MMOL/L (ref 98–107)
CO2 SERPL-SCNC: 28.4 MMOL/L (ref 22–29)
CREAT SERPL-MCNC: 0.83 MG/DL (ref 0.76–1.27)
DEPRECATED RDW RBC AUTO: 47.7 FL (ref 37–54)
EGFRCR SERPLBLD CKD-EPI 2021: 112.1 ML/MIN/1.73
EOSINOPHIL # BLD AUTO: 0.12 10*3/MM3 (ref 0–0.4)
EOSINOPHIL NFR BLD AUTO: 1.9 % (ref 0.3–6.2)
ERYTHROCYTE [DISTWIDTH] IN BLOOD BY AUTOMATED COUNT: 15.4 % (ref 12.3–15.4)
GLOBULIN UR ELPH-MCNC: 3 GM/DL
GLUCOSE SERPL-MCNC: 94 MG/DL (ref 65–99)
HCT VFR BLD AUTO: 42.6 % (ref 37.5–51)
HGB BLD-MCNC: 14 G/DL (ref 13–17.7)
IMM GRANULOCYTES # BLD AUTO: 0.01 10*3/MM3 (ref 0–0.05)
IMM GRANULOCYTES NFR BLD AUTO: 0.2 % (ref 0–0.5)
INR PPP: 0.99 (ref 0.86–1.15)
LIPASE SERPL-CCNC: 68 U/L (ref 13–60)
LYMPHOCYTES # BLD AUTO: 1.52 10*3/MM3 (ref 0.7–3.1)
LYMPHOCYTES NFR BLD AUTO: 24.6 % (ref 19.6–45.3)
MCH RBC QN AUTO: 28.2 PG (ref 26.6–33)
MCHC RBC AUTO-ENTMCNC: 32.9 G/DL (ref 31.5–35.7)
MCV RBC AUTO: 85.9 FL (ref 79–97)
MONOCYTES # BLD AUTO: 0.64 10*3/MM3 (ref 0.1–0.9)
MONOCYTES NFR BLD AUTO: 10.4 % (ref 5–12)
NEUTROPHILS NFR BLD AUTO: 3.86 10*3/MM3 (ref 1.7–7)
NEUTROPHILS NFR BLD AUTO: 62.4 % (ref 42.7–76)
NRBC BLD AUTO-RTO: 0 /100 WBC (ref 0–0.2)
PLATELET # BLD AUTO: 175 10*3/MM3 (ref 140–450)
PMV BLD AUTO: 10.3 FL (ref 6–12)
POTASSIUM SERPL-SCNC: 3.9 MMOL/L (ref 3.5–5.2)
PROT SERPL-MCNC: 7.3 G/DL (ref 6–8.5)
PROTHROMBIN TIME: 13.3 SECONDS (ref 11.8–14.9)
RBC # BLD AUTO: 4.96 10*6/MM3 (ref 4.14–5.8)
SODIUM SERPL-SCNC: 140 MMOL/L (ref 136–145)
WBC NRBC COR # BLD AUTO: 6.18 10*3/MM3 (ref 3.4–10.8)

## 2023-12-05 PROCEDURE — 80053 COMPREHEN METABOLIC PANEL: CPT | Performed by: EMERGENCY MEDICINE

## 2023-12-05 PROCEDURE — 96374 THER/PROPH/DIAG INJ IV PUSH: CPT

## 2023-12-05 PROCEDURE — 25810000003 SODIUM CHLORIDE 0.9 % SOLUTION: Performed by: EMERGENCY MEDICINE

## 2023-12-05 PROCEDURE — 83690 ASSAY OF LIPASE: CPT | Performed by: EMERGENCY MEDICINE

## 2023-12-05 PROCEDURE — 25010000002 ONDANSETRON PER 1 MG: Performed by: EMERGENCY MEDICINE

## 2023-12-05 PROCEDURE — 96375 TX/PRO/DX INJ NEW DRUG ADDON: CPT

## 2023-12-05 PROCEDURE — 99283 EMERGENCY DEPT VISIT LOW MDM: CPT

## 2023-12-05 PROCEDURE — 85025 COMPLETE CBC W/AUTO DIFF WBC: CPT | Performed by: EMERGENCY MEDICINE

## 2023-12-05 PROCEDURE — 85610 PROTHROMBIN TIME: CPT | Performed by: EMERGENCY MEDICINE

## 2023-12-05 RX ORDER — METHION/INOS/CHOL BT/B COM/LIV 110MG-86MG
100 CAPSULE ORAL ONCE
Status: COMPLETED | OUTPATIENT
Start: 2023-12-05 | End: 2023-12-05

## 2023-12-05 RX ORDER — ONDANSETRON 4 MG/1
4 TABLET, ORALLY DISINTEGRATING ORAL EVERY 8 HOURS PRN
Qty: 14 TABLET | Refills: 0 | Status: SHIPPED | OUTPATIENT
Start: 2023-12-05

## 2023-12-05 RX ORDER — FOLIC ACID 1 MG/1
1 TABLET ORAL ONCE
Status: COMPLETED | OUTPATIENT
Start: 2023-12-05 | End: 2023-12-05

## 2023-12-05 RX ORDER — PANTOPRAZOLE SODIUM 40 MG/10ML
80 INJECTION, POWDER, LYOPHILIZED, FOR SOLUTION INTRAVENOUS ONCE
Status: COMPLETED | OUTPATIENT
Start: 2023-12-05 | End: 2023-12-05

## 2023-12-05 RX ORDER — MULTIPLE VITAMINS W/ MINERALS TAB 9MG-400MCG
1 TAB ORAL ONCE
Status: COMPLETED | OUTPATIENT
Start: 2023-12-05 | End: 2023-12-05

## 2023-12-05 RX ORDER — ONDANSETRON 2 MG/ML
4 INJECTION INTRAMUSCULAR; INTRAVENOUS ONCE
Status: COMPLETED | OUTPATIENT
Start: 2023-12-05 | End: 2023-12-05

## 2023-12-05 RX ADMIN — FOLIC ACID 1 MG: 1 TABLET ORAL at 14:38

## 2023-12-05 RX ADMIN — Medication 100 MG: at 14:38

## 2023-12-05 RX ADMIN — PANTOPRAZOLE SODIUM 80 MG: 40 INJECTION, POWDER, FOR SOLUTION INTRAVENOUS at 16:30

## 2023-12-05 RX ADMIN — SODIUM CHLORIDE 1000 ML: 9 INJECTION, SOLUTION INTRAVENOUS at 14:30

## 2023-12-05 RX ADMIN — Medication 1 TABLET: at 14:38

## 2023-12-05 RX ADMIN — ONDANSETRON 4 MG: 2 INJECTION INTRAMUSCULAR; INTRAVENOUS at 14:30

## 2023-12-05 NOTE — ED PROVIDER NOTES
Time: 2:31 PM EST  Date of encounter:  12/5/2023  Independent Historian/Clinical History and Information was obtained by:   Patient    History is limited by: N/A    Chief Complaint: Nausea vomiting      History of Present Illness:  Patient is a 43 y.o. year old male who presents to the emergency department for evaluation of nausea vomiting.  Patient is in [facility] for recovery secondary to alcohol abuse.  Last drink was 6 days ago patient reportedly had some vomiting with some blood in it today.      HPI    Patient Care Team  Primary Care Provider: Parker Suarez MD    Past Medical History:     No Known Allergies  Past Medical History:   Diagnosis Date    Alcohol abuse     Anxiety     Depression     Hypertension      Past Surgical History:   Procedure Laterality Date    TONSILLECTOMY       History reviewed. No pertinent family history.    Home Medications:  Prior to Admission medications    Medication Sig Start Date End Date Taking? Authorizing Provider   albuterol sulfate  (90 Base) MCG/ACT inhaler Inhale 2 puffs Every 4 (Four) Hours As Needed for Wheezing. 1/17/19   Mary Nogueira PA-C   azithromycin (ZITHROMAX Z-YING) 250 MG tablet Take 2 tablets the first day, then 1 tablet daily for 4 days. 1/17/19   Mary Nogueira PA-C   clonazePAM (KlonoPIN) 1 MG tablet Take 1 tablet by mouth 2 (Two) Times a Day As Needed for Anxiety. 7/11/23   Bassem Elise MD   hydrOXYzine (VISTARIL) 50 MG capsule Take 1 capsule by mouth Daily. Before bedtime. 1/17/19   Mary Nogueira PA-C   ondansetron ODT (ZOFRAN-ODT) 4 MG disintegrating tablet Place 1 tablet on the tongue Every 6 (Six) Hours As Needed for Nausea or Vomiting. 7/11/23   Bassem Elise MD        Social History:   Social History     Tobacco Use    Smoking status: Never   Substance Use Topics    Alcohol use: Yes     Comment: Drinks daily    Drug use: No         Review of Systems:  Review of Systems   Constitutional:  Negative for  "chills and fever.   HENT:  Negative for congestion, rhinorrhea and sore throat.    Eyes:  Negative for pain and visual disturbance.   Respiratory:  Negative for apnea, cough, chest tightness and shortness of breath.    Cardiovascular:  Negative for chest pain and palpitations.   Gastrointestinal:  Negative for abdominal pain, diarrhea, nausea and vomiting.   Genitourinary:  Negative for difficulty urinating and dysuria.   Musculoskeletal:  Negative for joint swelling and myalgias.   Skin:  Negative for color change.   Neurological:  Negative for seizures and headaches.   Psychiatric/Behavioral: Negative.     All other systems reviewed and are negative.       Physical Exam:  /85   Pulse 94   Temp 97.9 °F (36.6 °C)   Resp 14   Ht 165.1 cm (65\")   Wt 79.7 kg (175 lb 11.3 oz)   SpO2 93%   BMI 29.24 kg/m²     Physical Exam  Vitals and nursing note reviewed.   Constitutional:       General: He is not in acute distress.     Appearance: Normal appearance. He is not toxic-appearing.   HENT:      Head: Normocephalic and atraumatic.      Jaw: There is normal jaw occlusion.   Eyes:      General: Lids are normal.      Extraocular Movements: Extraocular movements intact.      Conjunctiva/sclera: Conjunctivae normal.      Pupils: Pupils are equal, round, and reactive to light.   Cardiovascular:      Rate and Rhythm: Normal rate and regular rhythm.      Pulses: Normal pulses.      Heart sounds: Normal heart sounds.   Pulmonary:      Effort: Pulmonary effort is normal. No respiratory distress.      Breath sounds: Normal breath sounds. No wheezing or rhonchi.   Abdominal:      General: Abdomen is flat.      Palpations: Abdomen is soft.      Tenderness: There is no abdominal tenderness. There is no guarding or rebound.   Musculoskeletal:         General: Normal range of motion.      Cervical back: Normal range of motion and neck supple.      Right lower leg: No edema.      Left lower leg: No edema.   Skin:     General: " Skin is warm and dry.   Neurological:      Mental Status: He is alert and oriented to person, place, and time. Mental status is at baseline.   Psychiatric:         Mood and Affect: Mood normal.                  Procedures:  Procedures      Medical Decision Making:      Comorbidities that affect care:    Substance Abuse    External Notes reviewed:    Previous Clinic Note: Family medicine office visit for alcohol abuse, anxiety      The following orders were placed and all results were independently analyzed by me:  Orders Placed This Encounter   Procedures    Comprehensive Metabolic Panel    Lipase    Protime-INR    CBC Auto Differential    CBC & Differential       Medications Given in the Emergency Department:  Medications   pantoprazole (PROTONIX) injection 80 mg (has no administration in time range)   multivitamin with minerals 1 tablet (1 tablet Oral Given 12/5/23 1438)   folic acid (FOLVITE) tablet 1 mg (1 mg Oral Given 12/5/23 1438)   thiamine (VITAMIN B-1) tablet 100 mg (100 mg Oral Given 12/5/23 1438)   sodium chloride 0.9 % bolus 1,000 mL (1,000 mL Intravenous New Bag 12/5/23 1430)   ondansetron (ZOFRAN) injection 4 mg (4 mg Intravenous Given 12/5/23 1430)        ED Course:         Labs:    Lab Results (last 24 hours)       Procedure Component Value Units Date/Time    CBC & Differential [[id number]]  (Normal) Collected: 12/05/23 1430    Specimen: Blood Updated: 12/05/23 1439    Narrative:      The following orders were created for panel order CBC & Differential.  Procedure                               Abnormality         Status                     ---------                               -----------         ------                     CBC Auto Differential[[id number]]        Normal              Final result                 Please view results for these tests on the individual orders.    Comprehensive Metabolic Panel [[id number]]  (Abnormal) Collected: 12/05/23 1430    Specimen: Blood Updated: 12/05/23 7298      Glucose 94 mg/dL      BUN 14 mg/dL      Creatinine 0.83 mg/dL      Sodium 140 mmol/L      Potassium 3.9 mmol/L      Comment: Slight hemolysis detected by analyzer. Result may be falsely elevated.        Chloride 103 mmol/L      CO2 28.4 mmol/L      Calcium 9.2 mg/dL      Total Protein 7.3 g/dL      Albumin 4.3 g/dL      ALT (SGPT) 361 U/L      AST (SGOT) 264 U/L      Alkaline Phosphatase 98 U/L      Total Bilirubin 0.5 mg/dL      Globulin 3.0 gm/dL      A/G Ratio 1.4 g/dL      BUN/Creatinine Ratio 16.9     Anion Gap 8.6 mmol/L      eGFR 112.1 mL/min/1.73     Narrative:      GFR Normal >60  Chronic Kidney Disease <60  Kidney Failure <15      Lipase [[id number]]  (Abnormal) Collected: 12/05/23 1430    Specimen: Blood Updated: 12/05/23 1505     Lipase 68 U/L     Protime-INR [[id number]]  (Normal) Collected: 12/05/23 1430    Specimen: Blood Updated: 12/05/23 1447     Protime 13.3 Seconds      INR 0.99    Narrative:      Suggested Therapeutic Ranges For Oral Anticoagulant Therapy:  Level of Therapy                      INR Target Range  Standard Dose                            2.0-3.0  High Dose                                2.5-3.5  Patients not receiving anticoagulant  Therapy Normal Range                     0.86-1.15    CBC Auto Differential [[id number]]  (Normal) Collected: 12/05/23 1430    Specimen: Blood Updated: 12/05/23 1439     WBC 6.18 10*3/mm3      RBC 4.96 10*6/mm3      Hemoglobin 14.0 g/dL      Hematocrit 42.6 %      MCV 85.9 fL      MCH 28.2 pg      MCHC 32.9 g/dL      RDW 15.4 %      RDW-SD 47.7 fl      MPV 10.3 fL      Platelets 175 10*3/mm3      Neutrophil % 62.4 %      Lymphocyte % 24.6 %      Monocyte % 10.4 %      Eosinophil % 1.9 %      Basophil % 0.5 %      Immature Grans % 0.2 %      Neutrophils, Absolute 3.86 10*3/mm3      Lymphocytes, Absolute 1.52 10*3/mm3      Monocytes, Absolute 0.64 10*3/mm3      Eosinophils, Absolute 0.12 10*3/mm3      Basophils, Absolute 0.03 10*3/mm3      Immature Grans,  Absolute 0.01 10*3/mm3      nRBC 0.0 /100 WBC              Imaging:    No Radiology Exams Resulted Within Past 24 Hours      Differential Diagnosis and Discussion:    Metabolic: Differential diagnosis includes but is not limited to hypertension, hyperglycemia, hyperkalemia, hypocalcemia, metabolic acidosis, hypokalemia, hypoglycemia, malnutrition, hypothyroidism, hyperthyroidism, and adrenal insufficiency.     All labs were reviewed and interpreted by me.    MDM  Number of Diagnoses or Management Options  Nausea and vomiting, unspecified vomiting type  Diagnosis management comments: In summary this is a 43-year-old male patient with a history of alcoholism has been alcohol free for 6 days who presents emerged department for evaluation of vomiting today.  She does report there was a lot of blood in nares well.  Has had no further episodes of hematemesis today.  CBC independently reviewed by me and shows no critical abnormalities.  CMP independently reviewed by me and shows no critical abnormalities.  Lipase level acceptable.  Patient is received medications in the emergency department and is otherwise well-appearing in no acute distress.  Very strict return to ER and follow-up instructions have been provided to the patient.                   Patient Care Considerations:    CT HEAD: I considered ordering a noncontrast CT of the head, however no focal neurologic deficit      Consultants/Shared Management Plan:    None    Social Determinants of Health:    Patient is independent, reliable, and has access to care.       Disposition and Care Coordination:    Discharged: I considered escalation of care by admitting this patient for observation, however the patient has improved and is suitable and  stable for discharge.    I have explained the patient´s condition, diagnoses and treatment plan based on the information available to me at this time. I have answered questions and addressed any concerns. The patient has a good   understanding of the patient´s diagnosis, condition, and treatment plan as can be expected at this point. The vital signs have been stable. The patient´s condition is stable and appropriate for discharge from the emergency department.      The patient will pursue further outpatient evaluation with the primary care physician or other designated or consulting physician as outlined in the discharge instructions. They are agreeable to this plan of care and follow-up instructions have been explained in detail. The patient has received these instructions in written format and have expressed an understanding of the discharge instructions. The patient is aware that any significant change in condition or worsening of symptoms should prompt an immediate return to this or the closest emergency department or call to 911.  I have explained discharge medications and the need for follow up with the patient/caretakers. This was also printed in the discharge instructions. Patient was discharged with the following medications and follow up:      Medication List        Changed      * ondansetron ODT 4 MG disintegrating tablet  Commonly known as: ZOFRAN-ODT  Place 1 tablet on the tongue Every 6 (Six) Hours As Needed for Nausea or Vomiting.  What changed: Another medication with the same name was added. Make sure you understand how and when to take each.     * ondansetron ODT 4 MG disintegrating tablet  Commonly known as: ZOFRAN-ODT  Place 1 tablet on the tongue Every 8 (Eight) Hours As Needed for Nausea or Vomiting.  What changed: You were already taking a medication with the same name, and this prescription was added. Make sure you understand how and when to take each.           * This list has 2 medication(s) that are the same as other medications prescribed for you. Read the directions carefully, and ask your doctor or other care provider to review them with you.                   Where to Get Your Medications        These  medications were sent to [facility]/pharmacy #[id number] - [place], [place] - [address] - [phone]  - [phone]   [address], [place] [address]      Phone: [phone]   ondansetron ODT 4 MG disintegrating tablet      Parker Suarez MD  [address] [facility] [address] [facility] [address] [phone]    In 1 week         Final diagnoses:   Nausea and vomiting, unspecified vomiting type        ED Disposition       ED Disposition   Discharge    Condition   Stable    Comment   --               This medical record created using voice recognition software.             Gunnar Araujo MD  12/05/23 2432

## 2023-12-06 ENCOUNTER — HOSPITAL ENCOUNTER (EMERGENCY)
Facility: HOSPITAL | Age: 43
Discharge: HOME | End: 2023-12-06

## 2023-12-06 VITALS
DIASTOLIC BLOOD PRESSURE: 69 MMHG | OXYGEN SATURATION: 98 % | HEIGHT: 66 IN | TEMPERATURE: 97.8 F | BODY MASS INDEX: 28.63 KG/M2 | HEART RATE: 81 BPM | WEIGHT: 178.13 LBS | SYSTOLIC BLOOD PRESSURE: 107 MMHG | RESPIRATION RATE: 20 BRPM

## 2023-12-06 PROCEDURE — 99211 OFF/OP EST MAY X REQ PHY/QHP: CPT

## 2024-04-12 ENCOUNTER — OFFICE VISIT (OUTPATIENT)
Dept: SLEEP MEDICINE | Facility: HOSPITAL | Age: 44
End: 2024-04-12

## 2024-04-12 VITALS
SYSTOLIC BLOOD PRESSURE: 141 MMHG | BODY MASS INDEX: 34.16 KG/M2 | HEIGHT: 65 IN | OXYGEN SATURATION: 94 % | DIASTOLIC BLOOD PRESSURE: 89 MMHG | WEIGHT: 205 LBS | HEART RATE: 94 BPM

## 2024-04-12 DIAGNOSIS — G47.33 OSA (OBSTRUCTIVE SLEEP APNEA): Primary | ICD-10-CM

## 2024-04-12 PROCEDURE — G0463 HOSPITAL OUTPT CLINIC VISIT: HCPCS

## 2024-04-12 NOTE — PROGRESS NOTES
[facility]      Patient Care Team:  Parker Suarez MD as PCP - General (Family Medicine)    Referring Provider: Parker Suarez MD    Chief complaint:   Excessive daytime fatigue and sleepiness    History of present illness:    Subjective   This is a 43-year-old male patient with history of sleep apnea.  Used to follow with Dr. Jimenes but was last seen in 2018.    ODESSA:  Split night PSG  2018 (180 Lb). AHI of 125/h.  Final pressure was CPAP 17 with residual AHI 1.9.  He was placed on CPAP which he was using  consistently but now not using it much, He said he feels that it  blows a lot of air and sometimes he feels suffocated and like he's gasping for air and he has to take the mask off.     He gained 25 Lb since his sleep study.    He currently complains about loud snoring, apnea, frequent awakening, severe excessive daytime sleepiness, dry mouth, waking up with headache, grinding teeth and falling asleep inadvertently.    Sleep schedule:  -Bedtime: 9:30- 10 PM  -Sleep latency: Quickly  -Wake up time: 5:30 AM , does not feel refreshed  -Nocturnal awakenin-6 times because of nocturia.  No difficulties going back to sleep.  -Perceived sleep hours: 2-4    Mask: FFM which does fit well.  No significant air leak or dry mouth  DME: [facility] ([facility] [place])     ESS: Total score: 24     Review of Systems  Constitutional: No fever or chills. No changes in appetite.   ENMT: Nasal congestion and postnasal drip.  Dry mouth.  Cardiovascular: No chest pain, palpitation or legs swelling.    Respiratory: No dyspnea, cough or wheezing.  Gastrointestinal: No constipation, diarrhea, abdominal pain or acid reflux  Neurology: No headache, weakness, numbness or dizziness.   Musculoskeletal: No joints pain, stiffness or swelling.   Psychiatry: Anxiety and depression.  Hem/Lymphatic: No swollen glands or easy bruising.  Integumentary: No rash.  Endocrinology: No  excessive thirst, cold or warm intolerance.   Urinary: No dysuria, bloody urine or frequent urination.     History  Past Medical History:   Diagnosis Date    Alcohol abuse     Anxiety     Depression     Hypertension     RLS (restless legs syndrome)    ,   Past Surgical History:   Procedure Laterality Date    TONSILLECTOMY     , History reviewed. No pertinent family history.,   Social History     Socioeconomic History    Marital status: Unknown   Tobacco Use    Smoking status: Never   Vaping Use    Vaping status: Never Used   Substance and Sexual Activity    Alcohol use: Yes     Comment: Drinks daily    Drug use: No    Sexual activity: Defer     E-cigarette/Vaping    E-cigarette/Vaping Use Never User      E-cigarette/Vaping Substances    Nicotine No     THC No     CBD No     Flavoring No      E-cigarette/Vaping Devices    Disposable No     Pre-filled or Refillable Cartridge No     Refillable Tank No     Pre-filled Pod No          , and Allergies:  Patient has no known allergies.    Medications:    Current Outpatient Medications:     albuterol sulfate  (90 Base) MCG/ACT inhaler, Inhale 2 puffs Every 4 (Four) Hours As Needed for Wheezing., Disp: 1 inhaler, Rfl: 0    azithromycin (ZITHROMAX Z-YING) 250 MG tablet, Take 2 tablets the first day, then 1 tablet daily for 4 days., Disp: 6 tablet, Rfl: 0    clonazePAM (KlonoPIN) 1 MG tablet, Take 1 tablet by mouth 2 (Two) Times a Day As Needed for Anxiety., Disp: 10 tablet, Rfl: 0    hydrOXYzine (VISTARIL) 50 MG capsule, Take 1 capsule by mouth Daily. Before bedtime., Disp: 10 capsule, Rfl: 0    ondansetron ODT (ZOFRAN-ODT) 4 MG disintegrating tablet, Place 1 tablet on the tongue Every 6 (Six) Hours As Needed for Nausea or Vomiting., Disp: 15 tablet, Rfl: 0    ondansetron ODT (ZOFRAN-ODT) 4 MG disintegrating tablet, Place 1 tablet on the tongue Every 8 (Eight) Hours As Needed for Nausea or Vomiting., Disp: 14 tablet, Rfl: 0      Objective   Vital Signs:  Vitals:     "04/12/24 1100   BP: 141/89   Pulse: 94   SpO2: 94%   Weight: 93 kg (205 lb)   Height: 165.1 cm (65\")     Body mass index is 34.11 kg/m².  Neck Circumference: 17 inches     Physical Exam:  Neck Circumference: 17 inches     Constitutional: Not in acute distress.  Eyes: Injected conjunctiva, EOMI. pupils equal reactive to light.  ENMT: Alatorre score 4. Mallampati score 4.  Neck: Large. No thyromegaly.  Trachea midline.  Heart: Regular rhythm and rate, no murmur  Lungs: Good and equal air entry bilaterally. No crackles or wheezing.  Nonlabored breathing.       Abdomen: Obese.  Soft.  No tenderness.  Positive bowel sounds.  Extremities: No cyanosis, clubbing or pitting edema.  Warm extremities and well-perfused.  Neuro: Conscious, alert, oriented x3.  Gait is normal.  Strength 5/5 in arms and legs.  Psych: Appropriate mood and affect.    Integumentary: No rash.  Normal skin turgor.  Lymphatic: No palpable cervical or supraclavicular lymph nodes.    Diagnostic data:    CPAP download showed:  Date: Last 30 days  Usage (days): 73 %  Days used>4h: 13 %  AHI: 23/h  Leak: 14 min and 19 seconds  Usage: 1h and 50 min  Auto CPAP: 19 cm H2O    Assessment   Severe ODESSA, intolerant to CPAP  Obesity, BMI 34  Hypersomnia, unspecified.  Secondary to poorly treated sleep apnea      Plan:  Check Pap titration study.  He may require bilevel titration.  Counseled against driving or operating heavy machinery when sleepy.  Obtain at least 7-8 hours of sleep.  Counseled for weight loss.  Encouraged to exercise regularly and cut down on carbohydrates.  Discussed that losing weight may decrease the severity of sleep apnea and obviate the need of CPAP therapy.  RTC 2-3-months          Apple Marsh MD  04/12/24  11:39 EDT    This note was dictated utilizing Dragon dictation          "

## 2024-05-03 ENCOUNTER — HOSPITAL ENCOUNTER (OUTPATIENT)
Dept: SLEEP MEDICINE | Facility: HOSPITAL | Age: 44
End: 2024-05-03
Payer: MEDICAID

## 2024-05-03 DIAGNOSIS — G47.33 OSA (OBSTRUCTIVE SLEEP APNEA): ICD-10-CM

## 2024-05-03 PROCEDURE — 95811 POLYSOM 6/>YRS CPAP 4/> PARM: CPT

## 2024-05-14 DIAGNOSIS — G47.34 SLEEP RELATED HYPOXIA: ICD-10-CM

## 2024-05-14 DIAGNOSIS — G47.33 OSA (OBSTRUCTIVE SLEEP APNEA): Primary | ICD-10-CM

## 2025-06-26 ENCOUNTER — OFFICE VISIT (OUTPATIENT)
Dept: FAMILY MEDICINE CLINIC | Facility: CLINIC | Age: 45
End: 2025-06-26
Payer: COMMERCIAL

## 2025-06-26 VITALS
WEIGHT: 210.6 LBS | HEIGHT: 65 IN | OXYGEN SATURATION: 97 % | BODY MASS INDEX: 35.09 KG/M2 | DIASTOLIC BLOOD PRESSURE: 89 MMHG | SYSTOLIC BLOOD PRESSURE: 135 MMHG | TEMPERATURE: 97.8 F | HEART RATE: 88 BPM

## 2025-06-26 DIAGNOSIS — Z11.59 NEED FOR HEPATITIS C SCREENING TEST: ICD-10-CM

## 2025-06-26 DIAGNOSIS — F33.1 MODERATE EPISODE OF RECURRENT MAJOR DEPRESSIVE DISORDER: ICD-10-CM

## 2025-06-26 DIAGNOSIS — F41.1 GAD (GENERALIZED ANXIETY DISORDER): ICD-10-CM

## 2025-06-26 DIAGNOSIS — F10.11 HISTORY OF ALCOHOL ABUSE: ICD-10-CM

## 2025-06-26 DIAGNOSIS — G47.33 OSA (OBSTRUCTIVE SLEEP APNEA): ICD-10-CM

## 2025-06-26 DIAGNOSIS — I10 PRIMARY HYPERTENSION: ICD-10-CM

## 2025-06-26 DIAGNOSIS — Z00.00 ANNUAL PHYSICAL EXAM: Primary | ICD-10-CM

## 2025-06-26 LAB
ALBUMIN SERPL-MCNC: 4.3 G/DL (ref 3.5–5.2)
ALBUMIN/GLOB SERPL: 1.1 G/DL
ALP SERPL-CCNC: 108 U/L (ref 39–117)
ALT SERPL W P-5'-P-CCNC: 41 U/L (ref 1–41)
ANION GAP SERPL CALCULATED.3IONS-SCNC: 10.5 MMOL/L (ref 5–15)
AST SERPL-CCNC: 29 U/L (ref 1–40)
BACTERIA UR QL AUTO: NORMAL /HPF
BASOPHILS # BLD AUTO: 0.04 10*3/MM3 (ref 0–0.2)
BASOPHILS NFR BLD AUTO: 0.6 % (ref 0–1.5)
BILIRUB SERPL-MCNC: 0.5 MG/DL (ref 0–1.2)
BILIRUB UR QL STRIP: NEGATIVE
BUN SERPL-MCNC: 8 MG/DL (ref 6–20)
BUN/CREAT SERPL: 10.1 (ref 7–25)
CALCIUM SPEC-SCNC: 9.7 MG/DL (ref 8.6–10.5)
CHLORIDE SERPL-SCNC: 94 MMOL/L (ref 98–107)
CHOLEST SERPL-MCNC: 198 MG/DL (ref 0–200)
CLARITY UR: CLEAR
CO2 SERPL-SCNC: 28.5 MMOL/L (ref 22–29)
COLOR UR: ABNORMAL
CREAT SERPL-MCNC: 0.79 MG/DL (ref 0.76–1.27)
DEPRECATED RDW RBC AUTO: 42.8 FL (ref 37–54)
EGFRCR SERPLBLD CKD-EPI 2021: 113 ML/MIN/1.73
EOSINOPHIL # BLD AUTO: 0.29 10*3/MM3 (ref 0–0.4)
EOSINOPHIL NFR BLD AUTO: 4.1 % (ref 0.3–6.2)
ERYTHROCYTE [DISTWIDTH] IN BLOOD BY AUTOMATED COUNT: 13.3 % (ref 12.3–15.4)
GLOBULIN UR ELPH-MCNC: 3.8 GM/DL
GLUCOSE SERPL-MCNC: 238 MG/DL (ref 65–99)
GLUCOSE UR STRIP-MCNC: ABNORMAL MG/DL
HBA1C MFR BLD: 7.6 % (ref 4.8–5.6)
HCT VFR BLD AUTO: 45.3 % (ref 37.5–51)
HCV AB SER QL: NORMAL
HDLC SERPL-MCNC: 56 MG/DL (ref 40–60)
HGB BLD-MCNC: 15 G/DL (ref 13–17.7)
HGB UR QL STRIP.AUTO: NEGATIVE
HYALINE CASTS UR QL AUTO: NORMAL /LPF
IMM GRANULOCYTES # BLD AUTO: 0.02 10*3/MM3 (ref 0–0.05)
IMM GRANULOCYTES NFR BLD AUTO: 0.3 % (ref 0–0.5)
KETONES UR QL STRIP: NEGATIVE
LDLC SERPL CALC-MCNC: 128 MG/DL (ref 0–100)
LDLC/HDLC SERPL: 2.26 {RATIO}
LEUKOCYTE ESTERASE UR QL STRIP.AUTO: NEGATIVE
LYMPHOCYTES # BLD AUTO: 2.21 10*3/MM3 (ref 0.7–3.1)
LYMPHOCYTES NFR BLD AUTO: 31.1 % (ref 19.6–45.3)
MCH RBC QN AUTO: 29.1 PG (ref 26.6–33)
MCHC RBC AUTO-ENTMCNC: 33.1 G/DL (ref 31.5–35.7)
MCV RBC AUTO: 87.8 FL (ref 79–97)
MONOCYTES # BLD AUTO: 0.57 10*3/MM3 (ref 0.1–0.9)
MONOCYTES NFR BLD AUTO: 8 % (ref 5–12)
NEUTROPHILS NFR BLD AUTO: 3.97 10*3/MM3 (ref 1.7–7)
NEUTROPHILS NFR BLD AUTO: 55.9 % (ref 42.7–76)
NITRITE UR QL STRIP: NEGATIVE
NRBC BLD AUTO-RTO: 0 /100 WBC (ref 0–0.2)
PH UR STRIP.AUTO: 6.5 [PH] (ref 5–8)
PLATELET # BLD AUTO: 301 10*3/MM3 (ref 140–450)
PMV BLD AUTO: 10.4 FL (ref 6–12)
POTASSIUM SERPL-SCNC: 3.3 MMOL/L (ref 3.5–5.2)
PROT SERPL-MCNC: 8.1 G/DL (ref 6–8.5)
PROT UR QL STRIP: ABNORMAL
RBC # BLD AUTO: 5.16 10*6/MM3 (ref 4.14–5.8)
RBC # UR STRIP: NORMAL /HPF
REF LAB TEST METHOD: NORMAL
SODIUM SERPL-SCNC: 133 MMOL/L (ref 136–145)
SP GR UR STRIP: 1.02 (ref 1–1.03)
SQUAMOUS #/AREA URNS HPF: NORMAL /HPF
TRIGL SERPL-MCNC: 76 MG/DL (ref 0–150)
TSH SERPL DL<=0.05 MIU/L-ACNC: 1.84 UIU/ML (ref 0.27–4.2)
UROBILINOGEN UR QL STRIP: ABNORMAL
VLDLC SERPL-MCNC: 14 MG/DL (ref 5–40)
WBC # UR STRIP: NORMAL /HPF
WBC NRBC COR # BLD AUTO: 7.1 10*3/MM3 (ref 3.4–10.8)

## 2025-06-26 PROCEDURE — 81001 URINALYSIS AUTO W/SCOPE: CPT | Performed by: STUDENT IN AN ORGANIZED HEALTH CARE EDUCATION/TRAINING PROGRAM

## 2025-06-26 PROCEDURE — 83036 HEMOGLOBIN GLYCOSYLATED A1C: CPT | Performed by: STUDENT IN AN ORGANIZED HEALTH CARE EDUCATION/TRAINING PROGRAM

## 2025-06-26 PROCEDURE — 86803 HEPATITIS C AB TEST: CPT | Performed by: STUDENT IN AN ORGANIZED HEALTH CARE EDUCATION/TRAINING PROGRAM

## 2025-06-26 PROCEDURE — 80050 GENERAL HEALTH PANEL: CPT | Performed by: STUDENT IN AN ORGANIZED HEALTH CARE EDUCATION/TRAINING PROGRAM

## 2025-06-26 PROCEDURE — 80061 LIPID PANEL: CPT | Performed by: STUDENT IN AN ORGANIZED HEALTH CARE EDUCATION/TRAINING PROGRAM

## 2025-06-26 RX ORDER — BUSPIRONE HYDROCHLORIDE 10 MG/1
2 TABLET ORAL 3 TIMES DAILY
COMMUNITY
Start: 2025-06-23

## 2025-06-26 RX ORDER — BUPROPION HYDROCHLORIDE 300 MG/1
300 TABLET ORAL EVERY MORNING
COMMUNITY
Start: 2025-06-23

## 2025-06-26 RX ORDER — AMLODIPINE BESYLATE 10 MG/1
10 TABLET ORAL NIGHTLY
COMMUNITY
Start: 2025-06-23

## 2025-06-26 RX ORDER — ESCITALOPRAM OXALATE 20 MG/1
1 TABLET ORAL DAILY
COMMUNITY
Start: 2025-06-23

## 2025-06-26 RX ORDER — BISOPROLOL FUMARATE 10 MG/1
1 TABLET, FILM COATED ORAL DAILY
COMMUNITY
Start: 2023-01-17 | End: 2025-06-26

## 2025-06-26 NOTE — PROGRESS NOTES
Adult Male Preventive Health Visit  DOS: 25    Patient: Basilio Soriano  :  1980  Age: 44 y.o.  Gender: male   MRN: [id number]    Chief Complaint:   Annual Health Maintenance  Establish Care (Would like blood work ) and Insomnia    Subjective   Patient is here for annual physical. Other complaints are prediabetes, insomnia, hypertension, generalized anxiety disorder and weight management    History of Present Illness  The patient presents for evaluation of prediabetes, insomnia, hypertension, anxiety, and weight management.    He was diagnosed with borderline diabetes a few months ago and was prescribed metformin. . Recently, he has been experiencing dizziness, fatigue at work, frequent urination at night. He is seeking further blood work to investigate these symptoms. He has been taking metformin intermittently every other day over the past 90 days, which was prescribed by a rehab center. He has been consuming prune juice to alleviate the constipation caused by metformin. He has a family history of diabetes and is concerned about his own risk. He has been trying to manage his weight through diet but finds it challenging. He has gained weight recently and is currently 210 pounds. He has tried various diets, including chicken and rice, and steak and rice, but has found it difficult to lose weight. He prepares most of his meals at home using an air fryer.    He is also seeking treatment for insomnia. He has difficulty falling asleep and maintaining sleep for 6 to 8 hours, which he attributes to his work schedule. He has tried over-the-counter remedies and hydroxyzine without success. He has previously taken Klonopin for anxiety and sleep issues, which he found beneficial. He has also tried trazodone without relief. He is currently taking Lexapro and BuSpar and Wellbutrin.     He has a history of high blood pressure and is on amlodipine. He does not monitor his blood pressure at home.  His blood  pressure today is 135/89.    He has a history of alcoholism but has been abstinent for the past 1.5 years. He reports no return to use.     He has sleep apnea and uses a CPAP machine, although inconsistently due to his sleep issues. He reports feeling tired throughout the day.       Allergies:   Allergies   Allergen Reactions    Pantoprazole Hives, Rash and Unknown - Low Severity     Medications:  Current Outpatient Medications on File Prior to Visit   Medication Sig Dispense Refill    amLODIPine (NORVASC) 10 MG tablet Take 1 tablet by mouth Every Night.      buPROPion XL (WELLBUTRIN XL) 300 MG 24 hr tablet Take 1 tablet by mouth Every Morning.      busPIRone (BUSPAR) 10 MG tablet Take 2 tablets by mouth 3 times a day.      escitalopram (LEXAPRO) 20 MG tablet Take 1 tablet by mouth Daily.      [DISCONTINUED] bisoprolol (ZEBeta) 10 MG tablet Take 1 tablet by mouth Daily.      [DISCONTINUED] metFORMIN (GLUCOPHAGE) 500 MG tablet Take 1 tablet by mouth Every 12 (Twelve) Hours. (Patient taking differently: Take 1 tablet by mouth Every 12 (Twelve) Hours. Takes every other day)      albuterol sulfate  (90 Base) MCG/ACT inhaler Inhale 2 puffs Every 4 (Four) Hours As Needed for Wheezing. (Patient not taking: Reported on 6/26/2025) 1 inhaler 0    [DISCONTINUED] azithromycin (ZITHROMAX Z-YING) 250 MG tablet Take 2 tablets the first day, then 1 tablet daily for 4 days. (Patient not taking: Reported on 6/26/2025) 6 tablet 0    [DISCONTINUED] clonazePAM (KlonoPIN) 1 MG tablet Take 1 tablet by mouth 2 (Two) Times a Day As Needed for Anxiety. (Patient not taking: Reported on 6/26/2025) 10 tablet 0    [DISCONTINUED] hydrOXYzine (VISTARIL) 50 MG capsule Take 1 capsule by mouth Daily. Before bedtime. (Patient not taking: Reported on 6/26/2025) 10 capsule 0    [DISCONTINUED] ondansetron ODT (ZOFRAN-ODT) 4 MG disintegrating tablet Place 1 tablet on the tongue Every 6 (Six) Hours As Needed for Nausea or Vomiting. (Patient not  "taking: Reported on 6/26/2025) 15 tablet 0    [DISCONTINUED] ondansetron ODT (ZOFRAN-ODT) 4 MG disintegrating tablet Place 1 tablet on the tongue Every 8 (Eight) Hours As Needed for Nausea or Vomiting. 14 tablet 0     No current facility-administered medications on file prior to visit.      I have reviewed and updated as appropriate the past medical, surgical, family, and social history as summarized below:  Past Medical, Social and Family History:     Past Medical History:   Diagnosis Date    Alcohol abuse     Anxiety     Depression     Hypertension     RLS (restless legs syndrome)      Social History     Tobacco Use    Smoking status: Never    Smokeless tobacco: Never   Substance Use Topics    Alcohol use: Yes     Comment: Drinks daily     History reviewed. No pertinent family history.  Immunization History   Administered Date(s) Administered    COVID-19 (MODERNA) 1st,2nd,3rd Dose Monovalent 09/03/2021, 10/02/2021, 10/22/2021     Review of Systems    Objective   Vital Signs:   Vitals:    06/26/25 1022 06/26/25 1109   BP: 149/96 135/89   BP Location: Left arm Left arm   Patient Position: Sitting    Cuff Size: Adult    Pulse: 88    Temp: 97.8 °F (36.6 °C)    TempSrc: Temporal    SpO2: 97%    Weight: 95.5 kg (210 lb 9.6 oz)    Height: 165.1 cm (65\")      Body mass index is 35.05 kg/m².    Wt Readings from Last 3 Encounters:   06/26/25 95.5 kg (210 lb 9.6 oz)   04/12/24 93 kg (205 lb)   12/05/23 79.7 kg (175 lb 11.3 oz)     BP Readings from Last 3 Encounters:   06/26/25 135/89   04/12/24 141/89   12/05/23 114/85       Physical Exam  Constitutional:       Appearance: Normal appearance.   HENT:      Head: Normocephalic and atraumatic.      Mouth/Throat:      Mouth: Mucous membranes are moist.   Cardiovascular:      Rate and Rhythm: Normal rate and regular rhythm.      Pulses: Normal pulses.      Heart sounds: Normal heart sounds.   Pulmonary:      Effort: Pulmonary effort is normal.      Breath sounds: Normal breath " sounds.   Abdominal:      Palpations: Abdomen is soft.   Neurological:      General: No focal deficit present.      Mental Status: He is alert and oriented to person, place, and time.   Psychiatric:         Mood and Affect: Mood normal.         Behavior: Behavior normal.       Physical Exam  Respiratory: Clear to auscultation, no wheezing, rales or rhonchi  Cardiovascular: Heart rate is 88       Health Maintenance   Topic Date Due    HEPATITIS C SCREENING  Never done    ANNUAL PHYSICAL  Never done    COVID-19 Vaccine (4 - 2024-25 season) 07/10/2025 (Originally 9/1/2024)    Hepatitis B (1 of 3 - 19+ 3-dose series) 12/23/2025 (Originally 7/21/2000)    TDAP/TD VACCINES (1 - Tdap) 12/23/2025 (Originally 7/21/2000)    INFLUENZA VACCINE  07/01/2025    Pneumococcal Vaccine 0-49  Aged Out       Lifestyle:  Marital Status: single  Household members: children   Work Status: employed  Weight Concerns: Yes  Balanced diet: No  Regular Exercise: No    Class 2 Severe Obesity (BMI >=35 and <=39.9). Obesity-related health conditions include the following: hypertension, coronary heart disease, and diabetes mellitus. Obesity is worsening. BMI is is above average; BMI management plan is completed. We discussed portion control and increasing exercise.      Fall Risk:  STEADI Fall Risk Assessment has not been completed.    Social Screening Questions:  Basilio Soriano  reports that he has never smoked. He has never used smokeless tobacco. I    Drug Use: denied.  Alcohol Use: history of alcohol abuse sober for yr and half  Sexually Active: No  Sexual Preference: heterosexual    PHQ-9 Depression Screening  Little interest or pleasure in doing things? Not at all   Feeling down, depressed, or hopeless? Not at all   PHQ-2 Total Score 0   Trouble falling or staying asleep, or sleeping too much?     Feeling tired or having little energy?     Poor appetite or overeating?     Feeling bad about yourself - or that you are a failure or have let  yourself or your family down?     Trouble concentrating on things, such as reading the newspaper or watching television?     Moving or speaking so slowly that other people could have noticed? Or the opposite - being so fidgety or restless that you have been moving around a lot more than usual?     Thoughts that you would be better off dead, or of hurting yourself in some way?     PHQ-9 Total Score     If you checked off any problems, how difficult have these problems made it for you to do your work, take care of things at home, or get along with other people? Not difficult at all       Vision/Hearing/Dental  Regular Dental Visits: No  Vision Problems: No  Hearing Loss: No    Health Screening  Colon Cancer Screening:   Last Completed Colonoscopy    This patient has no relevant Health Maintenance data.       Colon Screening Method: N/A  Lung Cancer Screening: N/A  Prostate Cancer Screening: N/A    Safety  Smoke Detectors in Home: Yes  Carbon Monoxide Detectors in Home: Yes  Seatbelt use: Yes  Sunscreen Use: Yes    Results  Labs   - Liver enzymes: , 361 and 264    Imaging   - Liver imagin2024, Cirrhotic morphology with no evidence of abnormal parenchymal enhancement or lesions            Assessment   Diagnoses and all orders for this visit:    1. Annual physical exam (Primary)  -     CBC & Differential; Future  -     Comprehensive Metabolic Panel  -     Hemoglobin A1c; Future  -     Lipid Panel  -     TSH Rfx On Abnormal To Free T4  -     Urinalysis With Microscopic - Urine, Clean Catch  -     CBC & Differential  -     Hemoglobin A1c    2. ODESSA (obstructive sleep apnea)    3. Primary hypertension    4. AMY (generalized anxiety disorder)    5. Moderate episode of recurrent major depressive disorder    6. History of alcohol abuse    7. Need for hepatitis C screening test  -     Hepatitis C Antibody; Future  -     Hepatitis C Antibody        Assessment & Plan  1. Prediabetes.  - was taking metformin but currently  taking every other day due to constipation issues.  - Reports symptoms of dizziness, fatigue, frequent urination at night  - Comprehensive set of laboratory tests will be conducted today, including kidney function, liver function, electrolytes, hemoglobin, blood cells, hemoglobin A1c, lipid panel, thyroid, urine test, and hepatitis C screening.  His previous lab work showed elevated liver enzymes in 2023.  Depending on the lab results, a liver ultrasound may be considered.    2. Insomnia.  - Reports significant difficulty sleeping and has tried various over-the-counter medications without success.  - Previously used Klonopin for anxiety and sleep but is currently not on it.  - Will get the lab work done, prescribe medication for insomnia    3. Hypertension.  - Blood pressure is slightly elevated today.  - Currently taking amlodipine daily.  - Advised to monitor blood pressure at home regularly.  - Weight loss and DASH diet is recommended to help manage blood pressure.    4. Anxiety.  - Currently taking BuSpar (buspirone) 10 mg twice daily and Lexapro (escitalopram) and Wellbutrin for anxiety.  -Getting medications from previous rehab facility for alcohol rehab  -Will continue current medications, request records from previous provider    5. Weight management.  - Has gained weight recently and is concerned about its impact on health, including sleep apnea.  - Weight loss options will be discussed during the next visit, depending on the blood work results.    Follow-up  - Follow up in 1 month.       Return in about 1 month (around 7/26/2025).    Patient Care Team:  Yasmeen Dorman MD as PCP - General (Family Medicine)    Electronically signed by Yasmeen Dorman MD, 06/26/25, 10:56 AM EDT.    Patient or patient representative verbalized consent for the use of Ambient Listening during the visit with  Yasmeen Dorman MD for chart documentation. 6/26/2025  12:59 EDT

## 2025-06-30 ENCOUNTER — TELEPHONE (OUTPATIENT)
Dept: FAMILY MEDICINE CLINIC | Facility: CLINIC | Age: 45
End: 2025-06-30

## 2025-06-30 NOTE — TELEPHONE ENCOUNTER
Caller: Basilio Soriano Jr    Relationship: Self    Best call back number: [phone]     Caller requesting test results: SELF    What test was performed: BLOOD WORK    When was the test performed: 6/26/25    Where was the test performed: [facility]

## 2025-07-01 ENCOUNTER — PATIENT ROUNDING (BHMG ONLY) (OUTPATIENT)
Dept: FAMILY MEDICINE CLINIC | Facility: CLINIC | Age: 45
End: 2025-07-01
Payer: COMMERCIAL

## 2025-07-01 NOTE — PROGRESS NOTES
A [employer] MESSAGE HAS BEEN SENT TO THE PATIENT FOR PATIENT ROUNDING WITH [facility] [place]

## 2025-07-09 ENCOUNTER — HOSPITAL ENCOUNTER (EMERGENCY)
Facility: HOSPITAL | Age: 45
Discharge: HOME | End: 2025-07-09
Attending: EMERGENCY MEDICINE | Admitting: EMERGENCY MEDICINE
Payer: COMMERCIAL

## 2025-07-09 ENCOUNTER — APPOINTMENT (OUTPATIENT)
Dept: GENERAL RADIOLOGY | Facility: HOSPITAL | Age: 45
End: 2025-07-09
Payer: COMMERCIAL

## 2025-07-09 VITALS
HEIGHT: 65 IN | HEART RATE: 77 BPM | WEIGHT: 222.66 LBS | DIASTOLIC BLOOD PRESSURE: 71 MMHG | RESPIRATION RATE: 20 BRPM | SYSTOLIC BLOOD PRESSURE: 138 MMHG | OXYGEN SATURATION: 93 % | TEMPERATURE: 97.7 F | BODY MASS INDEX: 37.1 KG/M2

## 2025-07-09 DIAGNOSIS — T07.XXXA MULTIPLE LACERATIONS: ICD-10-CM
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
## 2025-07-09 DIAGNOSIS — S30.0XXA CONTUSION OF LOWER BACK, INITIAL ENCOUNTER: ICD-10-CM

## 2025-07-09 PROCEDURE — 25010000002 KETOROLAC TROMETHAMINE PER 15 MG: Performed by: EMERGENCY MEDICINE

## 2025-07-09 PROCEDURE — 96372 THER/PROPH/DIAG INJ SC/IM: CPT

## 2025-07-09 PROCEDURE — 99283 EMERGENCY DEPT VISIT LOW MDM: CPT

## 2025-07-09 PROCEDURE — 90471 IMMUNIZATION ADMIN: CPT | Performed by: EMERGENCY MEDICINE

## 2025-07-09 PROCEDURE — 25010000002 TETANUS-DIPHTH-ACELL PERTUSSIS 5-2.5-18.5 LF-MCG/0.5 SUSPENSION PREFILLED SYRINGE: Performed by: EMERGENCY MEDICINE

## 2025-07-09 PROCEDURE — 90715 TDAP VACCINE 7 YRS/> IM: CPT | Performed by: EMERGENCY MEDICINE

## 2025-07-09 PROCEDURE — 71101 X-RAY EXAM UNILAT RIBS/CHEST: CPT

## 2025-07-09 PROCEDURE — 25010000002 LIDOCAINE 1% - EPINEPHRINE 1:100000 1 %-1:100000 SOLUTION: Performed by: EMERGENCY MEDICINE

## 2025-07-09 RX ORDER — LIDOCAINE HYDROCHLORIDE AND EPINEPHRINE 10; 10 MG/ML; UG/ML
10 INJECTION, SOLUTION INFILTRATION; PERINEURAL ONCE
Status: COMPLETED | OUTPATIENT
Start: 2025-07-09 | End: 2025-07-09

## 2025-07-09 RX ORDER — KETOROLAC TROMETHAMINE 30 MG/ML
30 INJECTION, SOLUTION INTRAMUSCULAR; INTRAVENOUS ONCE
Status: COMPLETED | OUTPATIENT
Start: 2025-07-09 | End: 2025-07-09

## 2025-07-09 RX ADMIN — KETOROLAC TROMETHAMINE 30 MG: 30 INJECTION, SOLUTION INTRAMUSCULAR; INTRAVENOUS at 07:50

## 2025-07-09 RX ADMIN — TETANUS TOXOID, REDUCED DIPHTHERIA TOXOID AND ACELLULAR PERTUSSIS VACCINE, ADSORBED 0.5 ML: 5; 2.5; 8; 8; 2.5 SUSPENSION INTRAMUSCULAR at 07:50

## 2025-07-09 RX ADMIN — LIDOCAINE HYDROCHLORIDE,EPINEPHRINE BITARTRATE 10 ML: 10; .01 INJECTION, SOLUTION INFILTRATION; PERINEURAL at 07:53

## 2025-07-09 NOTE — ED PROVIDER NOTES
Time: 7:28 AM EDT  Date of encounter:  7/9/2025  Independent Historian/Clinical History and Information was obtained by:   Patient    History is limited by: N/A    Chief Complaint: Fall, wounds      History of Present Illness:  Patient is a 44 y.o. year old male who presents to the emergency department for evaluation of fall and wounds.  States that he lost his balance when getting out of his bed and fell into his entertainment center breaking the glass.  States he landed on his right side getting cuts on his right side and flank.  Does report it hurts sometimes when he takes deep breath.  No other complaints at this time.      Patient Care Team  Primary Care Provider: Yasmeen Dorman MD    Past Medical History:     Allergies   Allergen Reactions    Pantoprazole Hives, Rash and Unknown - Low Severity     Past Medical History:   Diagnosis Date    Alcohol abuse     Anxiety     Depression     Hypertension     RLS (restless legs syndrome)      Past Surgical History:   Procedure Laterality Date    TONSILLECTOMY       History reviewed. No pertinent family history.    Home Medications:  Prior to Admission medications    Medication Sig Start Date End Date Taking? Authorizing Provider   albuterol sulfate  (90 Base) MCG/ACT inhaler Inhale 2 puffs Every 4 (Four) Hours As Needed for Wheezing.  Patient not taking: Reported on 6/26/2025 1/17/19   Mary Nogueira PA-C   amLODIPine (NORVASC) 10 MG tablet Take 1 tablet by mouth Every Night. 6/23/25   Grey Lloyd MD   buPROPion XL (WELLBUTRIN XL) 300 MG 24 hr tablet Take 1 tablet by mouth Every Morning. 6/23/25   Grey Lloyd MD   busPIRone (BUSPAR) 10 MG tablet Take 2 tablets by mouth 3 times a day. 6/23/25   Grey Lloyd MD   escitalopram (LEXAPRO) 20 MG tablet Take 1 tablet by mouth Daily. 6/23/25   Grey Lloyd MD   metFORMIN (FORTAMET) 1000 MG (OSM) 24 hr tablet Take 1 tablet by mouth Daily With Breakfast. 7/2/25   Dandy  "MD Yasmeen   Semaglutide,0.25 or 0.5MG/DOS, (Ozempic, 0.25 or 0.5 MG/DOSE,) 2 MG/1.5ML solution pen-injector Inject 0.25 mg under the skin into the appropriate area as directed 1 (One) Time Per Week. Start taking 0.25 mg thank you per week for 4 weeks followed by 0.5 mg/week for 4 weeks 7/2/25   Yasmeen Dorman MD        Social History:   Social History     Tobacco Use    Smoking status: Never    Smokeless tobacco: Never   Vaping Use    Vaping status: Never Used   Substance Use Topics    Alcohol use: Not Currently     Comment: As of 7/9/25, patient reports 1.5 years sober    Drug use: No         Review of Systems:  Review of Systems     Physical Exam:  /93   Pulse 74   Temp 97.9 °F (36.6 °C) (Oral)   Resp 16   Ht 165.1 cm (65\")   Wt 101 kg (222 lb 10.6 oz)   SpO2 91%   BMI 37.05 kg/m²     Physical Exam  Vitals and nursing note reviewed.   Constitutional:       Appearance: Normal appearance.   HENT:      Head: Normocephalic and atraumatic.   Eyes:      General: No scleral icterus.  Cardiovascular:      Rate and Rhythm: Normal rate and regular rhythm.      Heart sounds: Normal heart sounds.   Pulmonary:      Effort: Pulmonary effort is normal.      Breath sounds: Normal breath sounds.   Abdominal:      Palpations: Abdomen is soft.      Tenderness: There is no abdominal tenderness.   Musculoskeletal:         General: Normal range of motion.      Cervical back: Normal range of motion.      Comments: Multiple superficial wounds to the right flank and right side of the back.  See photo.   Skin:     Findings: No rash.   Neurological:      General: No focal deficit present.      Mental Status: He is alert.                    Medical Decision Making:      Comorbidities that affect care:    Hypertension, obesity    External Notes reviewed:    Reviewed note from 6/26/2025      The following orders were placed and all results were independently analyzed by me:  Orders Placed This Encounter   Procedures    XR Ribs " Right With PA Chest       Medications Given in the Emergency Department:  Medications   lidocaine 1% - EPINEPHrine 1:506163 (XYLOCAINE W/EPI) 1 %-1:659725 injection 10 mL (10 mL Injection Given 7/9/25 0753)   Tetanus-Diphth-Acell Pertussis (BOOSTRIX) injection 0.5 mL (0.5 mL Intramuscular Given 7/9/25 0750)   ketorolac (TORADOL) injection 30 mg (30 mg Intramuscular Given 7/9/25 0750)        ED Course:    ED Course as of 07/09/25 0933 Wed Jul 09, 2025   0931 After cleaning the wounds there is a small area that could be sutured versus Steri-Stripped.  Patient prefers Steri-Strip at this time. [MA]      ED Course User Index  [MA] Sivakumar Muñoz MD       Labs:    Lab Results (last 24 hours)       ** No results found for the last 24 hours. **             Imaging:    XR Ribs Right With PA Chest  Result Date: 7/9/2025  XR RIBS RIGHT W PA CHEST Date of Exam: 7/9/2025 7:40 AM EDT Indication: fall Comparison: 1/17/2019 Findings: Cardiomediastinal silhouette is unremarkable.  No airspace disease, pneumothorax, nor pleural effusion. No acute osseous abnormality identified.     Impression: No acute process identified. Electronically Signed: Shay Eng MD  7/9/2025 8:06 AM EDT  Workstation ID: [id number]        Differential Diagnosis and Discussion:    Trauma:  Differential diagnosis considered but not limited to were subarachnoid hemorrhage, intracranial bleeding, pneumothorax, cardiac contusion, lung contusion, intra-abdominal bleeding, and compartment syndrome of any extremity or other significant traumatic pathology  Wound Evaluation: Differential diagnosis includes but is not limited to laceration, abrasion, puncture, burn, ulcer, cellulitis, abscess, vasculitis, malignancy, and rash.    PROCEDURES:    X-ray were performed in the emergency department and all X-ray impressions were independently interpreted by me.    No orders to display       Procedures    MDM     Amount and/or Complexity of Data Reviewed  Tests in  the radiology section of CPT®: reviewed       Patient is a 54-year-old who presents status post fall.  Fell into an [facility] which broke glass.  Has contusion as well as multiple superficial lacerations to the right side of the low back/flank.  Wounds appear to be very superficial.  There is one that is a little bit larger and discussion was had with the patient about Steri-Strip versus suture.  Patient prefers Steri-Strip at this time.  Tetanus shot was updated.  The wounds were cleaned.  Will have the patient follow-up as an outpatient as he is otherwise well-appearing at this time.                Patient Care Considerations:    CT scan      Consultants/Shared Management Plan:    None    Social Determinants of Health:    Patient is independent, reliable, and has access to care.       Disposition and Care Coordination:    Discharged: The patient is suitable and stable for discharge with no need for consideration of admission.    I have explained the patient´s condition, diagnoses and treatment plan based on the information available to me at this time. I have answered questions and addressed any concerns. The patient has a good  understanding of the patient´s diagnosis, condition, and treatment plan as can be expected at this point. The vital signs have been stable. The patient´s condition is stable and appropriate for discharge from the emergency department.      The patient will pursue further outpatient evaluation with the primary care physician or other designated or consulting physician as outlined in the discharge instructions. They are agreeable to this plan of care and follow-up instructions have been explained in detail. The patient has received these instructions in written format and have expressed an understanding of the discharge instructions. The patient is aware that any significant change in condition or worsening of symptoms should prompt an immediate return to this or the closest emergency  department or call to 911.      Final diagnoses:   Fall, initial encounter   Multiple lacerations   Contusion of lower back, initial encounter        ED Disposition       ED Disposition   Discharge    Condition   Stable    Comment   --               This medical record created using voice recognition software.             Sivakumar Muñoz MD  07/09/25 0983

## 2025-07-22 DIAGNOSIS — E78.2 MIXED HYPERLIPIDEMIA: Primary | ICD-10-CM

## 2025-07-22 RX ORDER — LOSARTAN POTASSIUM 100 MG/1
100 TABLET ORAL DAILY
Qty: 60 TABLET | Refills: 1 | Status: SHIPPED | OUTPATIENT
Start: 2025-07-22

## 2025-07-22 RX ORDER — ROSUVASTATIN CALCIUM 20 MG/1
20 TABLET, COATED ORAL DAILY
Qty: 90 TABLET | Refills: 1 | Status: SHIPPED | OUTPATIENT
Start: 2025-07-22

## 2025-07-29 ENCOUNTER — OFFICE VISIT (OUTPATIENT)
Dept: FAMILY MEDICINE CLINIC | Facility: CLINIC | Age: 45
End: 2025-07-29
Payer: COMMERCIAL

## 2025-07-29 VITALS
SYSTOLIC BLOOD PRESSURE: 117 MMHG | HEIGHT: 65 IN | HEART RATE: 87 BPM | TEMPERATURE: 97.6 F | OXYGEN SATURATION: 98 % | DIASTOLIC BLOOD PRESSURE: 75 MMHG | WEIGHT: 207.6 LBS | BODY MASS INDEX: 34.59 KG/M2

## 2025-07-29 DIAGNOSIS — R11.0 NAUSEA: ICD-10-CM

## 2025-07-29 DIAGNOSIS — G47.00 INSOMNIA, UNSPECIFIED TYPE: ICD-10-CM

## 2025-07-29 DIAGNOSIS — E66.01 CLASS 2 SEVERE OBESITY DUE TO EXCESS CALORIES WITH SERIOUS COMORBIDITY AND BODY MASS INDEX (BMI) OF 35.0 TO 35.9 IN ADULT: ICD-10-CM

## 2025-07-29 DIAGNOSIS — E66.812 CLASS 2 SEVERE OBESITY DUE TO EXCESS CALORIES WITH SERIOUS COMORBIDITY AND BODY MASS INDEX (BMI) OF 35.0 TO 35.9 IN ADULT: ICD-10-CM

## 2025-07-29 DIAGNOSIS — E11.65 TYPE 2 DIABETES MELLITUS WITH HYPERGLYCEMIA, WITHOUT LONG-TERM CURRENT USE OF INSULIN: Primary | ICD-10-CM

## 2025-07-29 DIAGNOSIS — I10 PRIMARY HYPERTENSION: ICD-10-CM

## 2025-07-29 DIAGNOSIS — G47.33 OSA (OBSTRUCTIVE SLEEP APNEA): ICD-10-CM

## 2025-07-29 PROCEDURE — 99214 OFFICE O/P EST MOD 30 MIN: CPT | Performed by: STUDENT IN AN ORGANIZED HEALTH CARE EDUCATION/TRAINING PROGRAM

## 2025-07-29 RX ORDER — ONDANSETRON 4 MG/1
4 TABLET, ORALLY DISINTEGRATING ORAL EVERY 8 HOURS PRN
Qty: 60 TABLET | Refills: 0 | Status: SHIPPED | OUTPATIENT
Start: 2025-07-29

## 2025-07-29 RX ORDER — HYDROXYZINE HYDROCHLORIDE 10 MG/1
10 TABLET, FILM COATED ORAL 3 TIMES DAILY PRN
Qty: 90 TABLET | Refills: 0 | Status: SHIPPED | OUTPATIENT
Start: 2025-07-29

## 2025-07-29 RX ORDER — SEMAGLUTIDE 0.68 MG/ML
0.5 INJECTION, SOLUTION SUBCUTANEOUS WEEKLY
Qty: 3 ML | Refills: 0 | Status: SHIPPED | OUTPATIENT
Start: 2025-07-29

## 2025-07-29 NOTE — PROGRESS NOTES
"Chief Complaint  Hypertension (F/u), Weight Management (Nausea ), and Insomnia (Wakes up every hour )    Subjective      Basilio Soriano is a 45 y.o. male who presents to [facility]     History of Present Illness  The patient presents for evaluation of sleep apnea, diabetes, hypertension, and cholesterol management.    He is currently on Wellbutrin, BuSpar, and Lexapro, which he reports as beneficial. He has an upcoming sleep appointment scheduled for 08/15/2025. He experiences difficulty in maintaining adequate sleep due to his work schedule and reports feeling jerky during sleep.    He is also on losartan 100 mg daily for blood pressure management.    He is taking Ozempic. He reports feeling less hungry and occasionally experiences nausea. He has lost a few pounds, with his current weight being 207 pounds. He is not currently taking metformin due to constipation issues but plans to resume it.    He is also on rosuvastatin for cholesterol management.    He had a diabetic eye exam done about 2 weeks ago when he got his glasses, and everything was normal.         Patient Care Team:  Yasmeen Dorman MD as PCP - General (Family Medicine)    Review of Systems      Objective   Vital Signs:   Vitals:    07/29/25 0951   BP: 117/75   BP Location: Left arm   Patient Position: Sitting   Cuff Size: Adult   Pulse: 87   Temp: 97.6 °F (36.4 °C)   TempSrc: Temporal   SpO2: 98%   Weight: 94.2 kg (207 lb 9.6 oz)   Height: 165.1 cm (65\")     Body mass index is 34.55 kg/m².    Wt Readings from Last 3 Encounters:   07/29/25 94.2 kg (207 lb 9.6 oz)   07/09/25 101 kg (222 lb 10.6 oz)   06/26/25 95.5 kg (210 lb 9.6 oz)     BP Readings from Last 3 Encounters:   07/29/25 117/75   07/09/25 138/71   06/26/25 135/89       Health Maintenance   Topic Date Due    DIABETIC FOOT EXAM  Never done    URINE MICROALBUMIN-CREATININE RATIO (uACR)  Never done    COVID-19 Vaccine (4 - 2024-25 season) 08/12/2025 " (Originally 9/1/2024)    Hepatitis B (1 of 3 - 19+ 3-dose series) 12/23/2025 (Originally 7/21/2000)    Pneumococcal Vaccine 0-49 (1 of 2 - PCV) 01/25/2026 (Originally 7/21/2000)    INFLUENZA VACCINE  10/01/2025    HEMOGLOBIN A1C  12/26/2025    ANNUAL PHYSICAL  06/26/2026    DIABETIC EYE EXAM  07/15/2026    TDAP/TD VACCINES (2 - Td or Tdap) 07/09/2035    HEPATITIS C SCREENING  Completed       Physical Exam  Constitutional:       Appearance: Normal appearance.   HENT:      Head: Normocephalic and atraumatic.   Cardiovascular:      Rate and Rhythm: Normal rate and regular rhythm.      Pulses: Normal pulses.      Heart sounds: Normal heart sounds.   Pulmonary:      Effort: Pulmonary effort is normal.      Breath sounds: Normal breath sounds.   Neurological:      General: No focal deficit present.      Mental Status: He is alert and oriented to person, place, and time.   Psychiatric:         Mood and Affect: Mood normal.         Behavior: Behavior normal.         Physical Exam  Respiratory: Clear to auscultation, no wheezing, rales or rhonchi       Result Review   The following data was reviewed by: Yasmeen Dorman MD on 07/29/2025:  [x]  Tests & Results  []  Hospitalization/Emergency Department/Urgent Care  []  Internal/External Consultant Notes      Results  Labs   - A1c: Normal   - Cholesterol levels: Slightly elevated       ASSESSMENT/PLAN  Diagnoses and all orders for this visit:    1. Type 2 diabetes mellitus with hyperglycemia, without long-term current use of insulin (Primary)  -     Semaglutide,0.25 or 0.5MG/DOS, (Ozempic, 0.25 or 0.5 MG/DOSE,) 2 MG/3ML solution pen-injector; Inject 0.5 mg under the skin into the appropriate area as directed 1 (One) Time Per Week.  Dispense: 3 mL; Refill: 0  -     Microalbumin / Creatinine Urine Ratio - Urine, Clean Catch    2. Class 2 severe obesity due to excess calories with serious comorbidity and body mass index (BMI) of 35.0 to 35.9 in adult  -     Semaglutide,0.25 or  0.5MG/DOS, (Ozempic, 0.25 or 0.5 MG/DOSE,) 2 MG/3ML solution pen-injector; Inject 0.5 mg under the skin into the appropriate area as directed 1 (One) Time Per Week.  Dispense: 3 mL; Refill: 0    3. ODESSA (obstructive sleep apnea)  -     Semaglutide,0.25 or 0.5MG/DOS, (Ozempic, 0.25 or 0.5 MG/DOSE,) 2 MG/3ML solution pen-injector; Inject 0.5 mg under the skin into the appropriate area as directed 1 (One) Time Per Week.  Dispense: 3 mL; Refill: 0    4. Primary hypertension  -     Semaglutide,0.25 or 0.5MG/DOS, (Ozempic, 0.25 or 0.5 MG/DOSE,) 2 MG/3ML solution pen-injector; Inject 0.5 mg under the skin into the appropriate area as directed 1 (One) Time Per Week.  Dispense: 3 mL; Refill: 0    5. Insomnia, unspecified type  -     hydrOXYzine (ATARAX) 10 MG tablet; Take 1 tablet by mouth 3 (Three) Times a Day As Needed for Anxiety (insomnia).  Dispense: 90 tablet; Refill: 0    6. Nausea  -     ondansetron ODT (ZOFRAN-ODT) 4 MG disintegrating tablet; Place 1 tablet on the tongue Every 8 (Eight) Hours As Needed for Nausea or Vomiting.  Dispense: 60 tablet; Refill: 0        Assessment & Plan  1. Sleep apnea.  - His sleep apnea needs to be addressed first to avoid potential breathing issues during sleep.  - Hydroxyzine 10 mg will be prescribed, to be taken half an hour before bedtime.  - He has an appointment with the sleep doctor on 08/15/2025.    2. Diabetes.  - He is advised to continue with Ozempic 0.5 mg injections for the next four weeks, after which the dosage will be increased to 1 mg.  - A prescription for nausea medication will be provided, which can be taken up to three times daily as needed.  - He is also advised to take metformin 1000 mg once daily along with Ozempic.  - A urine test will be conducted to check for proteinuria. His A1c levels will be rechecked in two months.    3. Hypertension.  - His blood pressure readings are within the normal range, currently on losartan 100 mg daily.  - He is also advised to  maintain a low-salt diet, engage in daily exercise, and aim for weight loss.    4. Cholesterol management.  - He has been advised to continue with his current cholesterol medication, rosuvastatin.  - Dietary modifications and weight loss efforts are encouraged to help manage cholesterol levels.    Follow-up: A follow-up visit is scheduled in 3 months.       FOLLOW UP  Return in about 3 months (around 10/29/2025).  Patient was given instructions and counseling regarding his condition or for health maintenance advice. Please see specific information pulled into the AVS if appropriate.       Yasmeen Dorman MD  07/29/25  13:30 EDT    Patient or patient representative verbalized consent for the use of Ambient Listening during the visit with  Yasmeen Dorman MD for chart documentation. 7/29/2025  13:30 EDT        Answers submitted by the patient for this visit:  Diabetes Questionnaire (Submitted on 7/26/2025)  Chief Complaint: Diabetes problem  Diabetes type: type 2  MedicAlert ID: [id number]  Disease duration: 4 Weeks  Treatment compliance: none of the time  Symptom course: improving  blurred vision: Yes  foot paresthesias: No  foot ulcerations: No  weight loss: No  blackouts: No  hospitalization: No  nocturnal hypoglycemia: Yes  required assistance: No  required glucagon: Yes  sweats: Yes  Current diet: diabetic, high fiber, high salt  Meal planning: none  Exercise: daily  Eye exam current: Yes  Sees podiatrist: No

## 2025-08-15 ENCOUNTER — OFFICE VISIT (OUTPATIENT)
Dept: SLEEP MEDICINE | Facility: HOSPITAL | Age: 45
End: 2025-08-15
Payer: COMMERCIAL

## 2025-08-15 VITALS
WEIGHT: 208.3 LBS | DIASTOLIC BLOOD PRESSURE: 79 MMHG | OXYGEN SATURATION: 98 % | SYSTOLIC BLOOD PRESSURE: 125 MMHG | HEART RATE: 85 BPM | BODY MASS INDEX: 34.7 KG/M2 | HEIGHT: 65 IN

## 2025-08-15 DIAGNOSIS — G47.33 OSA (OBSTRUCTIVE SLEEP APNEA): Primary | ICD-10-CM

## 2025-08-15 PROCEDURE — G0463 HOSPITAL OUTPT CLINIC VISIT: HCPCS

## 2025-08-15 RX ORDER — ONDANSETRON 4 MG/1
TABLET, FILM COATED ORAL EVERY 24 HOURS
COMMUNITY

## 2025-08-18 DIAGNOSIS — E78.2 MIXED HYPERLIPIDEMIA: ICD-10-CM

## 2025-08-18 DIAGNOSIS — R11.0 NAUSEA: ICD-10-CM

## 2025-08-19 RX ORDER — ROSUVASTATIN CALCIUM 20 MG/1
20 TABLET, COATED ORAL DAILY
Qty: 90 TABLET | Refills: 1 | Status: SHIPPED | OUTPATIENT
Start: 2025-08-19

## 2025-08-19 RX ORDER — ONDANSETRON 4 MG/1
TABLET, ORALLY DISINTEGRATING ORAL
Qty: 60 TABLET | Refills: 0 | Status: SHIPPED | OUTPATIENT
Start: 2025-08-19